# Patient Record
Sex: FEMALE | Race: WHITE | NOT HISPANIC OR LATINO | Employment: STUDENT | URBAN - METROPOLITAN AREA
[De-identification: names, ages, dates, MRNs, and addresses within clinical notes are randomized per-mention and may not be internally consistent; named-entity substitution may affect disease eponyms.]

---

## 2017-08-28 ENCOUNTER — TRANSCRIBE ORDERS (OUTPATIENT)
Dept: ADMINISTRATIVE | Facility: HOSPITAL | Age: 22
End: 2017-08-28

## 2017-08-28 DIAGNOSIS — N63.0 BREAST MASS: Primary | ICD-10-CM

## 2017-08-30 ENCOUNTER — HOSPITAL ENCOUNTER (OUTPATIENT)
Dept: ULTRASOUND IMAGING | Facility: HOSPITAL | Age: 22
Discharge: HOME | End: 2017-08-30
Admitting: PHYSICIAN ASSISTANT

## 2017-08-30 DIAGNOSIS — N63.0 BREAST MASS: ICD-10-CM

## 2017-08-30 PROCEDURE — 76642 ULTRASOUND BREAST LIMITED: CPT | Performed by: RADIOLOGY

## 2017-08-30 PROCEDURE — 76642 ULTRASOUND BREAST LIMITED: CPT

## 2019-01-18 ENCOUNTER — HOSPITAL ENCOUNTER (OUTPATIENT)
Facility: HOSPITAL | Age: 24
Discharge: HOME | End: 2019-01-18
Payer: MEDICAID

## 2019-01-18 PROCEDURE — 86480 TB TEST CELL IMMUN MEASURE: CPT

## 2019-01-21 ENCOUNTER — HOSPITAL ENCOUNTER (OUTPATIENT)
Dept: ULTRASOUND IMAGING | Facility: HOSPITAL | Age: 24
Discharge: HOME | End: 2019-01-21
Payer: MEDICAID

## 2019-01-21 DIAGNOSIS — R11.0 NAUSEA: ICD-10-CM

## 2019-01-21 DIAGNOSIS — R10.13 ABDOMINAL PAIN, EPIGASTRIC: ICD-10-CM

## 2019-01-21 DIAGNOSIS — R10.11 ABDOMINAL PAIN, RIGHT UPPER QUADRANT: ICD-10-CM

## 2019-01-21 PROCEDURE — 76705 ECHO EXAM OF ABDOMEN: CPT

## 2019-04-16 ENCOUNTER — HOSPITAL ENCOUNTER (OUTPATIENT)
Facility: HOSPITAL | Age: 24
Discharge: HOME | End: 2019-04-16
Payer: MEDICAID

## 2019-04-16 LAB
A/G RATIO: 2 (ref 0.8–2)
ALBUMIN SERPL-MCNC: 4.3 G/DL (ref 3.4–4.8)
ALP BLD-CCNC: 66 U/L (ref 25–100)
ALT SERPL-CCNC: 27 U/L (ref 4–36)
ANION GAP SERPL CALCULATED.3IONS-SCNC: 10 MMOL/L (ref 3–16)
AST SERPL-CCNC: 30 U/L (ref 8–33)
BASOPHILS ABSOLUTE: 0 K/UL (ref 0–0.1)
BASOPHILS RELATIVE PERCENT: 0.4 %
BILIRUB SERPL-MCNC: 0.4 MG/DL (ref 0.3–1.2)
BUN BLDV-MCNC: 22 MG/DL (ref 6–20)
CALCIUM SERPL-MCNC: 9.4 MG/DL (ref 8.5–10.5)
CHLORIDE BLD-SCNC: 103 MMOL/L (ref 98–107)
CHOLESTEROL, TOTAL: 176 MG/DL (ref 0–200)
CO2: 26 MMOL/L (ref 20–30)
CREAT SERPL-MCNC: 0.7 MG/DL (ref 0.4–1.2)
EOSINOPHILS ABSOLUTE: 0.1 K/UL (ref 0–0.4)
EOSINOPHILS RELATIVE PERCENT: 2.1 %
FOLATE: 11.34 NG/ML
GFR AFRICAN AMERICAN: >59
GFR NON-AFRICAN AMERICAN: >60
GLOBULIN: 2.1 G/DL
GLUCOSE BLD-MCNC: 87 MG/DL (ref 74–106)
HCT VFR BLD CALC: 40.8 % (ref 37–47)
HDLC SERPL-MCNC: 50 MG/DL (ref 40–60)
HEMOGLOBIN: 13.4 G/DL (ref 11.5–16.5)
IMMATURE GRANULOCYTES #: 0 K/UL
IMMATURE GRANULOCYTES %: 0.4 % (ref 0–5)
LDL CHOLESTEROL CALCULATED: 114 MG/DL
LYMPHOCYTES ABSOLUTE: 2.2 K/UL (ref 1.5–4)
LYMPHOCYTES RELATIVE PERCENT: 46.3 %
MCH RBC QN AUTO: 31.5 PG (ref 27–32)
MCHC RBC AUTO-ENTMCNC: 32.8 G/DL (ref 31–35)
MCV RBC AUTO: 95.8 FL (ref 80–100)
MONOCYTES ABSOLUTE: 0.3 K/UL (ref 0.2–0.8)
MONOCYTES RELATIVE PERCENT: 5.6 %
NEUTROPHILS ABSOLUTE: 2.2 K/UL (ref 2–7.5)
NEUTROPHILS RELATIVE PERCENT: 45.2 %
PDW BLD-RTO: 12.4 % (ref 11–16)
PLATELET # BLD: 185 K/UL (ref 150–400)
PMV BLD AUTO: 9.8 FL (ref 6–10)
POTASSIUM SERPL-SCNC: 4.4 MMOL/L (ref 3.4–5.1)
RBC # BLD: 4.26 M/UL (ref 3.8–5.8)
SODIUM BLD-SCNC: 139 MMOL/L (ref 136–145)
TOTAL PROTEIN: 6.4 G/DL (ref 6.4–8.3)
TRIGL SERPL-MCNC: 59 MG/DL (ref 0–249)
TSH SERPL DL<=0.05 MIU/L-ACNC: 2.14 UIU/ML (ref 0.35–5.5)
VITAMIN B-12: 398 PG/ML (ref 211–911)
VLDLC SERPL CALC-MCNC: 12 MG/DL
WBC # BLD: 4.8 K/UL (ref 4–11)

## 2019-04-16 PROCEDURE — 80061 LIPID PANEL: CPT

## 2019-04-16 PROCEDURE — 36415 COLL VENOUS BLD VENIPUNCTURE: CPT

## 2019-04-16 PROCEDURE — 82607 VITAMIN B-12: CPT

## 2019-04-16 PROCEDURE — 80053 COMPREHEN METABOLIC PANEL: CPT

## 2019-04-16 PROCEDURE — 84443 ASSAY THYROID STIM HORMONE: CPT

## 2019-04-16 PROCEDURE — 82746 ASSAY OF FOLIC ACID SERUM: CPT

## 2019-04-16 PROCEDURE — 85025 COMPLETE CBC W/AUTO DIFF WBC: CPT

## 2019-05-05 ENCOUNTER — OFFICE VISIT (OUTPATIENT)
Dept: PRIMARY CARE CLINIC | Age: 24
End: 2019-05-05
Payer: MEDICAID

## 2019-05-05 VITALS
SYSTOLIC BLOOD PRESSURE: 120 MMHG | DIASTOLIC BLOOD PRESSURE: 77 MMHG | HEART RATE: 86 BPM | TEMPERATURE: 98.1 F | WEIGHT: 157 LBS | OXYGEN SATURATION: 99 %

## 2019-05-05 DIAGNOSIS — H65.93 BILATERAL OTITIS MEDIA WITH EFFUSION: ICD-10-CM

## 2019-05-05 DIAGNOSIS — B96.89 ACUTE BACTERIAL SINUSITIS: Primary | ICD-10-CM

## 2019-05-05 DIAGNOSIS — J01.90 ACUTE BACTERIAL SINUSITIS: Primary | ICD-10-CM

## 2019-05-05 PROCEDURE — 99213 OFFICE O/P EST LOW 20 MIN: CPT | Performed by: NURSE PRACTITIONER

## 2019-05-05 RX ORDER — GUAIFENESIN 600 MG/1
1200 TABLET, EXTENDED RELEASE ORAL 2 TIMES DAILY PRN
COMMUNITY
Start: 2019-05-05

## 2019-05-05 RX ORDER — FLUCONAZOLE 150 MG/1
150 TABLET ORAL ONCE
Qty: 2 TABLET | Refills: 0 | Status: SHIPPED | OUTPATIENT
Start: 2019-05-05 | End: 2019-05-05

## 2019-05-05 RX ORDER — CEFDINIR 300 MG/1
300 CAPSULE ORAL 2 TIMES DAILY
Qty: 14 CAPSULE | Refills: 0 | Status: SHIPPED | OUTPATIENT
Start: 2019-05-05 | End: 2019-05-12

## 2019-05-05 RX ORDER — PROMETHAZINE HYDROCHLORIDE 6.25 MG/5ML
6.25 SYRUP ORAL 4 TIMES DAILY PRN
Qty: 1 BOTTLE | Refills: 0 | Status: SHIPPED | OUTPATIENT
Start: 2019-05-05 | End: 2019-05-12

## 2019-05-05 RX ORDER — BENZONATATE 100 MG/1
100 CAPSULE ORAL 2 TIMES DAILY PRN
Qty: 20 CAPSULE | Refills: 0 | Status: SHIPPED | OUTPATIENT
Start: 2019-05-05 | End: 2019-05-12

## 2019-05-05 RX ORDER — METHYLPREDNISOLONE 4 MG/1
TABLET ORAL
Qty: 1 KIT | Refills: 0 | Status: SHIPPED | OUTPATIENT
Start: 2019-05-05 | End: 2019-05-11

## 2019-05-05 ASSESSMENT — ENCOUNTER SYMPTOMS
COUGH: 1
DIARRHEA: 1

## 2019-05-05 NOTE — PROGRESS NOTES
Subjective:      Patient ID: Devante Pruett is a 20 y.o. female. HPI  C/o congestion and cough for the past 2 weeks. Productive cough with thick green mucous. She reports fever on Thurs/Fri of 102. Associated symptoms include HA, diarrhea, decreased appetite, trouble breathing at night- reports she sleeps elevated. Denies n/v, sob, wheezing, sore throat, earaches. She has taken OTC regimens with little relief of symptoms. Review of Systems   Constitutional: Positive for appetite change and fever. HENT: Positive for congestion. Respiratory: Positive for cough. Gastrointestinal: Positive for diarrhea. Neurological: Positive for headaches. Objective:   Physical Exam   Constitutional: She is oriented to person, place, and time. She appears well-developed and well-nourished. HENT:   Head: Normocephalic. Right Ear: External ear normal. There is drainage. A middle ear effusion is present. Left Ear: External ear normal. Tympanic membrane is scarred. A middle ear effusion is present. Nose: Mucosal edema present. Eyes: Pupils are equal, round, and reactive to light. Right eye exhibits no discharge. Left eye exhibits no discharge. No scleral icterus. Neck: Normal range of motion. Neck supple. No thyromegaly present. Cardiovascular: Normal rate, regular rhythm and normal heart sounds. Exam reveals no gallop and no friction rub. No murmur heard. Pulmonary/Chest: Effort normal and breath sounds normal. No respiratory distress. She has no wheezes. She has no rales. Musculoskeletal: Normal range of motion. She exhibits no edema, tenderness or deformity. Lymphadenopathy:     She has cervical adenopathy. Right cervical: Superficial cervical adenopathy present. Left cervical: Superficial cervical adenopathy present. Neurological: She is alert and oriented to person, place, and time. Skin: Skin is warm and dry. No rash noted. No erythema.    Psychiatric: She has a normal mood and affect.  Her behavior is normal. Judgment and thought content normal.       Assessment:      Sinusitis, BOM      Plan:      Omnicef, mucinex, medrol dose pack, tessalon perles, phenergan elixir, diflucan, tylenol or motrin for discomfort, increase fluids, rest        Valri Rough, APRN - NP

## 2019-05-27 ENCOUNTER — HOSPITAL ENCOUNTER (OUTPATIENT)
Facility: HOSPITAL | Age: 24
Discharge: HOME | End: 2019-05-27
Payer: MEDICAID

## 2019-05-27 PROCEDURE — 86480 TB TEST CELL IMMUN MEASURE: CPT

## 2019-05-30 LAB
QUANTI TB GOLD PLUS: NEGATIVE
QUANTI TB1 MINUS NIL: 0.03 IU/ML (ref 0–0.34)
QUANTI TB2 MINUS NIL: 0.06 IU/ML (ref 0–0.34)
QUANTIFERON MITOGEN: 9.82 IU/ML
QUANTIFERON NIL: 0.03 IU/ML

## 2019-07-18 NOTE — PROGRESS NOTES
[facility] AT [address], [address]  [place], [place], [address] [phone]  WWW[facility]           OUTPATIENT CLINIC CONSULTATION NOTE    Patient care team:  Patient Care Team:  Liset Hunt APRN as PCP - General (Family Medicine)    Requesting Provider and Reason for consultation: The patient is being seen today at the request of REMIGIO Cobos for this, presyncope.     Subjective:   Chief complaint:   Chief Complaint   Patient presents with   • Advice Only   • Numbness     hands, fingers   • Dizziness   • Shortness of Breath       HPI:    Zaida PELAYO is a 23 y.o. female.  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    Over the course of many years the patient has noted exertional lightheadedness, presyncope.  Symptoms seem to be worsening.  She has lost 80 pounds in the last year.  Denies associated chest pain otherwise but when she does have the symptoms of lightheadedness she develops cold hands/numbness.  Has palpitations on a daily basis.  No esvin syncope.    Does not use nicotine products.  Father had an MI at the age of 56.    Review of Systems:  For lightheadedness, presyncope, hand numbness, cold hands, exertional dyspnea  Negative for exertional chest pain, orthopnea, PND, lower extremity edema,  syncope.   All other systems reviewed and are negative.    PFSH:  Patient Active Problem List   Diagnosis   • Dysphagia   • Post-tonsillectomy pain   • Autonomic orthostatic hypotension         Current Outpatient Medications:   •  FLUoxetine (PROzac) 20 MG capsule, Take 20 mg by mouth Daily., Disp: , Rfl:   •  midodrine (PROAMATINE) 5 MG tablet, Take 1 tablet by mouth 3 (Three) Times a Day., Disp: 90 tablet, Rfl: 11    No Known Allergies    Social History     Socioeconomic History   • Marital status: Single     Spouse name: Not  "on file   • Number of children: Not on file   • Years of education: Not on file   • Highest education level: Not on file   Tobacco Use   • Smoking status: Never Smoker   • Smokeless tobacco: Never Used   Substance and Sexual Activity   • Alcohol use: No   • Drug use: No   • Sexual activity: No     Comment: wearing nuva ring insert     Family History   Problem Relation Age of Onset   • Breast cancer Maternal Aunt 30   • Hypertension Mother    • Thyroid disease Mother    • Heart disease Father    • Heart attack Father    • Stroke Father    • Diabetes Father    • Neuropathy Father    • Ovarian cancer Neg Hx    • Colon cancer Neg Hx    • BRCA 1/2 Neg Hx          Objective:   Physical Exam:  Blood pressure 132/82, pulse 92, height 160 cm (63\"), weight 73.7 kg (162 lb 6.4 oz).  CONSTITUTIONAL: Well-nourished. In no acute distress.   SKIN: Warm and dry. No rashes noted  HEENT: Head is normocephalic and atraumatic. Pupils are equal and reactive to light bilaterally. Mucous membranes are pink and moist.   NECK: Supple without masses or thyromegaly. There is no jugular venous distention at 30°.  LUNGS: Normal effort. Clear to auscultation bilaterally without wheezing, rhonchi, or rales noted.   CARDIOVASCULAR: Regular rate with a normal S1 and S2. There is no murmur, gallop, rub, or click appreciated. Carotid upstrokes are 2+ and symmetrical without bruits. There is no peripheral edema.  2+ DP pulses  ABDOMEN: Soft and nondistended. No tenderness with palpitation.   MUSCULOSKELETAL:  No digital cyanosis  NEUROLOGICAL: Nonfocal.  PSYCHIATRIC: Alert, orientated x 3, appropriate affect     Orthostatics in the office:  Lying, blood pressure 112/66, heart rate 95, sats 99%  Sitting, blood pressure 108/70, heart rate 84, sats 100%  Standing, blood pressure 90/68, heart rate 108, sats 100%    Labs:  BUN   Date Value Ref Range Status   12/20/2016 8 (L) 9 - 23 mg/dL Final     Creatinine   Date Value Ref Range Status   12/20/2016 0.60 " 0.60 - 1.30 mg/dL Final     Potassium   Date Value Ref Range Status   12/20/2016 4.1 3.5 - 5.5 mmol/L Final     WBC   Date Value Ref Range Status   04/16/2019 4.8 4.0 - 11.0 K/uL Final     Hemoglobin   Date Value Ref Range Status   04/16/2019 13.4 11.5 - 16.5 g/dL Final     Hematocrit   Date Value Ref Range Status   04/16/2019 40.8 37.0 - 47.0 % Final     Platelets   Date Value Ref Range Status   04/16/2019 185 150 - 400 K/uL Final       No results found for: CHOL  Lab Results   Component Value Date    TRIG 59 04/16/2019     Lab Results   Component Value Date    HDL 50 04/16/2019     Lab Results   Component Value Date     04/16/2019     No components found for: LDLDIRECTC    Labs reviewed, normal thyroid, normal electrolytes, normal liver/kidney function, normal hemoglobin    Diagnostic Data:      ECG 12 Lead  Date/Time: 7/19/2019 3:33 PM  Performed by: Matteo Quintana MD  Authorized by: Matteo Quintana MD   Previous ECG: no previous ECG available  Rhythm: sinus tachycardia  Comments: Heart rate 109 bpm, QRS 85, QTc 388            Assessment and Plan:   Zaida was seen today for advice only, numbness, dizziness and shortness of breath.    Diagnoses and all orders for this visit:    Autonomic orthostatic hypotension  Lightheadedness  Pre-syncope  -Symptoms consistent with autonomic dysfunction  -Orthostatics positive today with a drop in systolic pressure from lying to standing of greater than 20 mmHg and a heart rate increase of only 13 bpm  -48-hour Holter monitor  -Lifestyle modifications recommended including hydration with at least 32 ounces of additional fluids per day, liberalization of salt in her diet, regular forms of cardiac exercise, compression stockings if being on her feet for long periods of time, elevating her legs while at rest  -Trial of Midrin 5 mg 3 times daily, can increase to 10 mg 3 times daily if symptoms not significantly improved  -If she continues to have more rapid heart rates, can  consider low-dose of beta  -His symptoms do not significantly improve, consider tilt table study and echocardiogram      - Return in about 3 months (around 10/19/2019).    Matteo Quintana MD, MSc, [facility]  Interventional Cardiology  [facility] at [facility]

## 2019-07-19 ENCOUNTER — CONSULT (OUTPATIENT)
Dept: CARDIOLOGY | Facility: CLINIC | Age: 24
End: 2019-07-19

## 2019-07-19 ENCOUNTER — HOSPITAL ENCOUNTER (OUTPATIENT)
Facility: HOSPITAL | Age: 24
Discharge: HOME | End: 2019-07-19
Payer: MEDICAID

## 2019-07-19 VITALS
DIASTOLIC BLOOD PRESSURE: 82 MMHG | BODY MASS INDEX: 28.77 KG/M2 | WEIGHT: 162.4 LBS | SYSTOLIC BLOOD PRESSURE: 132 MMHG | HEIGHT: 63 IN | HEART RATE: 92 BPM

## 2019-07-19 DIAGNOSIS — I95.1 AUTONOMIC ORTHOSTATIC HYPOTENSION: Primary | ICD-10-CM

## 2019-07-19 DIAGNOSIS — R42 LIGHTHEADEDNESS: ICD-10-CM

## 2019-07-19 DIAGNOSIS — R55 PRE-SYNCOPE: ICD-10-CM

## 2019-07-19 PROCEDURE — 93226 XTRNL ECG REC<48 HR SCAN A/R: CPT

## 2019-07-19 PROCEDURE — 99244 OFF/OP CNSLTJ NEW/EST MOD 40: CPT | Performed by: INTERNAL MEDICINE

## 2019-07-19 PROCEDURE — 93225 XTRNL ECG REC<48 HRS REC: CPT

## 2019-07-19 PROCEDURE — 93005 ELECTROCARDIOGRAM TRACING: CPT

## 2019-07-19 PROCEDURE — 93000 ELECTROCARDIOGRAM COMPLETE: CPT | Performed by: INTERNAL MEDICINE

## 2019-07-19 RX ORDER — FLUOXETINE HYDROCHLORIDE 20 MG/1
20 CAPSULE ORAL DAILY
COMMUNITY

## 2019-07-19 RX ORDER — MIDODRINE HYDROCHLORIDE 5 MG/1
5 TABLET ORAL 3 TIMES DAILY
Qty: 90 TABLET | Refills: 11 | Status: SHIPPED | OUTPATIENT
Start: 2019-07-19 | End: 2019-10-28

## 2019-08-09 ENCOUNTER — TELEPHONE (OUTPATIENT)
Dept: CARDIOLOGY | Facility: CLINIC | Age: 24
End: 2019-08-09

## 2019-08-09 NOTE — TELEPHONE ENCOUNTER
----- Message from Matteo Quintana MD sent at 8/8/2019  6:32 PM EDT -----  Please let the patient know her monitor did not show a significant arrhythmia, but she does have a higher average heart rate of 96 bpm.  Please ask if the Midrin is helping.  If not, we will add a low-dose beta-blocker

## 2019-08-09 NOTE — TELEPHONE ENCOUNTER
Patient contacted to review monitor results. No answer, LVM requesting return call regarding the midodrine.

## 2019-08-09 NOTE — TELEPHONE ENCOUNTER
Patient returned call and stated that she has discontinued the Midodrine on Tuesday because it was causing an upset stomach and she did not feel as if it was helping her symptoms.

## 2019-08-13 RX ORDER — FLUDROCORTISONE ACETATE 0.1 MG/1
0.1 TABLET ORAL DAILY
Qty: 30 TABLET | Refills: 11 | Status: SHIPPED | OUTPATIENT
Start: 2019-08-13 | End: 2019-10-28

## 2019-08-13 NOTE — TELEPHONE ENCOUNTER
Patient contacted with recommendations per MJS. Pt to begin Florinef 0.1 mg daily. LVM requesting return call with any further questions.

## 2019-10-28 ENCOUNTER — OFFICE VISIT (OUTPATIENT)
Dept: CARDIOLOGY | Facility: CLINIC | Age: 24
End: 2019-10-28

## 2019-10-28 VITALS
OXYGEN SATURATION: 99 % | BODY MASS INDEX: 31.18 KG/M2 | WEIGHT: 176 LBS | SYSTOLIC BLOOD PRESSURE: 110 MMHG | HEIGHT: 63 IN | DIASTOLIC BLOOD PRESSURE: 74 MMHG | HEART RATE: 92 BPM

## 2019-10-28 DIAGNOSIS — I95.1 AUTONOMIC ORTHOSTATIC HYPOTENSION: Primary | ICD-10-CM

## 2019-10-28 DIAGNOSIS — R55 SYNCOPE AND COLLAPSE: ICD-10-CM

## 2019-10-28 PROCEDURE — 99214 OFFICE O/P EST MOD 30 MIN: CPT | Performed by: INTERNAL MEDICINE

## 2019-10-28 RX ORDER — SODIUM CHLORIDE 1000 MG
1 TABLET, SOLUBLE MISCELLANEOUS 2 TIMES DAILY WITH MEALS
Qty: 60 TABLET | Refills: 11 | Status: SHIPPED | OUTPATIENT
Start: 2019-10-28

## 2019-10-28 RX ORDER — FLUDROCORTISONE ACETATE 0.1 MG/1
0.2 TABLET ORAL DAILY
Qty: 60 TABLET | Refills: 11 | Status: SHIPPED | OUTPATIENT
Start: 2019-10-28 | End: 2019-12-19

## 2019-10-28 NOTE — PROGRESS NOTES
[facility] AT [address], [address]  [place], [place], [address] [phone]  [facility]           OUTPATIENT CLINIC FOLLOW UP NOTE    Patient Care Team:  Patient Care Team:  Liset Hunt APRN as PCP - General (Family Medicine)    Subjective:      Chief Complaint   Patient presents with   • orhtostatic hypotension       HPI:    Zaida Yates is a 24 y.o. female.  Cardiac problem list:  1. Lightheadedness, relative hypotension, relative tachycardia, autonomic dysfunction  2. Anxiety/depression    Today the patient presents for follow-up.    The patient tried Midrin but felt that it caused her to have an upset stomach.  Has been taking Florinef without difficulty but does not feel significantly better.  Still having daily symptoms of lightheadedness.  Had one episode of syncope while at work.  Symptoms improved with rest and drinking fluids.  The patient is exercising nearly on a daily basis on the elliptical or running.  Does not feel poorly when she does this.  Heart rate averaging in the 90s on her Apple watch.  At times she is noted into the 110s while at rest    Review of Systems:  Positive for lightheadedness, palpitations, syncope  Negative for exertional chest pain, dyspnea with exertion, orthopnea, PND, lower extremity edema     PFSH:  Patient Active Problem List   Diagnosis   • Dysphagia   • Post-tonsillectomy pain   • Autonomic orthostatic hypotension         Current Outpatient Medications:   •  fludrocortisone 0.1 MG tablet, Take 2 tablets by mouth Daily., Disp: 60 tablet, Rfl: 11  •  FLUoxetine (PROzac) 20 MG capsule, Take 20 mg by mouth Daily., Disp: , Rfl:   •  sodium chloride 1 g tablet, Take 1 tablet by mouth 2 (Two) Times a Day With Meals., Disp: 60 tablet, Rfl: 11    No Known Allergies     reports that she has never smoked. She has never used smokeless tobacco.    Family History   Problem Relation Age of Onset   • Breast cancer Maternal Aunt  "30   • Hypertension Mother    • Thyroid disease Mother    • Heart disease Father    • Heart attack Father    • Stroke Father    • Diabetes Father    • Neuropathy Father    • Ovarian cancer Neg Hx    • Colon cancer Neg Hx    • BRCA 1/2 Neg Hx          Objective:   Physical exam:  /74 (BP Location: Left arm, Patient Position: Standing)   Pulse 92   Ht 160 cm (63\")   Wt 79.8 kg (176 lb)   SpO2 99%   BMI 31.18 kg/m²   CONSTITUTIONAL: No acute distress  RESPIRATORY: Normal effort. Clear to auscultation bilaterally without wheezing or rales  CARDIOVASCULAR: Carotids with normal upstrokes without bruits.  Regular rate and rhythm with normal S1 and S2. Without murmur, gallop or rub. Normal radial pulse.   PSYCH: Normal affect    Labs:    BUN   Date Value Ref Range Status   12/20/2016 8 (L) 9 - 23 mg/dL Final     Creatinine   Date Value Ref Range Status   12/20/2016 0.60 0.60 - 1.30 mg/dL Final     Potassium   Date Value Ref Range Status   12/20/2016 4.1 3.5 - 5.5 mmol/L Final       No results found for: CHOL  Lab Results   Component Value Date    TRIG 59 04/16/2019     Lab Results   Component Value Date    HDL 50 04/16/2019     Lab Results   Component Value Date     04/16/2019     No components found for: LDLDIRECTC    Diagnostic Data:    Procedures    48-hour Holter 8/2019 - rare ectopy, average heart rate 96, no significant arrhythmia    Assessment and Plan:   Zaida was seen today for orhtostatic hypotension.    Diagnoses and all orders for this visit:    Autonomic orthostatic hypotension  -Patient is still having daily symptoms  -Recommend tilt table testing  -Midrin made her stomach upset.  Florinef 0.1 mg daily not helping.  Increase Florinef to 0.2 mg daily, start 1 g of salt twice daily, continue to maintain high levels of hydration, continue cardiac exercise  -If she is found to have a POTS-like syndrome, will start Zebeta and decrease Florinef      - Return in about 3 months (around " 1/28/2020).    Matteo Quintana MD, MSc, FACC

## 2019-12-12 ENCOUNTER — HOSPITAL ENCOUNTER (OUTPATIENT)
Dept: CARDIOLOGY | Facility: HOSPITAL | Age: 24
Discharge: HOME | End: 2019-12-12
Admitting: INTERNAL MEDICINE

## 2019-12-12 DIAGNOSIS — R55 SYNCOPE AND COLLAPSE: ICD-10-CM

## 2019-12-12 PROCEDURE — 93660 TILT TABLE EVALUATION: CPT

## 2019-12-12 PROCEDURE — 93660 TILT TABLE EVALUATION: CPT | Performed by: INTERNAL MEDICINE

## 2019-12-17 ENCOUNTER — TELEPHONE (OUTPATIENT)
Dept: CARDIOLOGY | Facility: CLINIC | Age: 24
End: 2019-12-17

## 2019-12-17 NOTE — TELEPHONE ENCOUNTER
Patient states that the increased Florinef and salt tablets have not made much of a difference in her symptoms.

## 2019-12-17 NOTE — TELEPHONE ENCOUNTER
----- Message from Matteo Quintana MD sent at 12/16/2019  5:57 PM EST -----  Please let the patient know her tilt table test was diagnostic for POTS (postural orthostatic tachycardia syndrome) without syncope.  Can you ask if the increased Florinef dosing and salt tablets have helped her at all?  If not, will consider decreasing Florinef and adding low doses of Zebeta

## 2019-12-19 RX ORDER — PROPRANOLOL HYDROCHLORIDE 20 MG/1
20 TABLET ORAL DAILY
Qty: 30 TABLET | Refills: 11 | Status: SHIPPED | OUTPATIENT
Start: 2019-12-19 | End: 2020-02-03

## 2019-12-19 RX ORDER — FLUDROCORTISONE ACETATE 0.1 MG/1
0.1 TABLET ORAL DAILY
Qty: 60 TABLET | Refills: 11
Start: 2019-12-19 | End: 2020-12-18

## 2019-12-19 NOTE — TELEPHONE ENCOUNTER
Patient contacted to review recommendations per MJS. Patient to decrease Florinef to 0.1 mg and begin propanolol 20 mg daily. LVM requesting return call to discuss further.

## 2020-02-03 ENCOUNTER — OFFICE VISIT (OUTPATIENT)
Dept: CARDIOLOGY | Facility: CLINIC | Age: 25
End: 2020-02-03

## 2020-02-03 VITALS
DIASTOLIC BLOOD PRESSURE: 62 MMHG | OXYGEN SATURATION: 98 % | WEIGHT: 187.6 LBS | SYSTOLIC BLOOD PRESSURE: 96 MMHG | HEART RATE: 94 BPM | HEIGHT: 62 IN | BODY MASS INDEX: 34.52 KG/M2

## 2020-02-03 DIAGNOSIS — I95.1 AUTONOMIC ORTHOSTATIC HYPOTENSION: ICD-10-CM

## 2020-02-03 DIAGNOSIS — G90.A POTS (POSTURAL ORTHOSTATIC TACHYCARDIA SYNDROME): Primary | ICD-10-CM

## 2020-02-03 PROCEDURE — 99214 OFFICE O/P EST MOD 30 MIN: CPT | Performed by: INTERNAL MEDICINE

## 2020-02-03 NOTE — PROGRESS NOTES
[facility] AT [address], [address]  [place], [place], [address] [phone]  WWW[facility]           OUTPATIENT CLINIC FOLLOW UP NOTE    Patient Care Team:  Patient Care Team:  Liset Hunt APRN as PCP - General (Family Medicine)    Subjective:      Chief Complaint   Patient presents with   • Dizziness   • Loss of Consciousness       HPI:    Zaida Yates is a 24 y.o. female.  Cardiac problem list:  1. POTS, Lightheadedness, relative hypotension, relative tachycardia, autonomic dysfunction  2. Anxiety/depression    Today the patient presents for follow-up.    Since the patient was last seen she has had continued lightheadedness and notes 2 syncopal episodes.  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  She did start propranolol as instructed, but does not notice any improvement in her symptoms.  She continues to take fludrocortisone, salt tablets, and is drinking at least 1/2 gallon of water daily.    Review of Systems:  Positive for lightheadedness, syncope  Negative for exertional chest pain, dyspnea with exertion, orthopnea, PND, lower extremity edema     PFSH:  Patient Active Problem List   Diagnosis   • Dysphagia   • Post-tonsillectomy pain   • Autonomic orthostatic hypotension         Current Outpatient Medications:   •  fludrocortisone 0.1 MG tablet, Take 1 tablet by mouth Daily., Disp: 60 tablet, Rfl: 11  •  FLUoxetine (PROzac) 20 MG capsule, Take 20 mg by mouth Daily., Disp: , Rfl:   •  propranolol (INDERAL) 20 MG tablet, Take 1 tablet by mouth Daily., Disp: 30 tablet, Rfl: 11  •  sodium chloride 1 g tablet, Take 1 tablet by mouth 2 (Two) Times a Day With Meals., Disp: 60 tablet, Rfl: 11    No Known Allergies     reports that she has never smoked. She has never used smokeless tobacco.    Family History   Problem Relation Age of Onset   • Breast cancer Maternal Aunt 30   • Hypertension Mother    •  "Thyroid disease Mother    • Heart disease Father    • Heart attack Father    • Stroke Father    • Diabetes Father    • Neuropathy Father    • Ovarian cancer Neg Hx    • Colon cancer Neg Hx    • BRCA 1/2 Neg Hx          Objective:   Physical exam:  BP 96/62 (BP Location: Left arm, Patient Position: Standing)   Pulse 94   Ht 157.5 cm (62\")   Wt 85.1 kg (187 lb 9.6 oz)   SpO2 98%   BMI 34.31 kg/m²   CONSTITUTIONAL: No acute distress  RESPIRATORY: Normal effort. Clear to auscultation bilaterally without wheezing or rales  CARDIOVASCULAR:  Regular rate and rhythm with normal S1 and S2. Without murmur, gallop or rub. Normal radial pulse.   PSYCH: Normal affect    Labs:    BUN   Date Value Ref Range Status   12/20/2016 8 (L) 9 - 23 mg/dL Final     Creatinine   Date Value Ref Range Status   12/20/2016 0.60 0.60 - 1.30 mg/dL Final     Potassium   Date Value Ref Range Status   12/20/2016 4.1 3.5 - 5.5 mmol/L Final       No results found for: CHOL  Lab Results   Component Value Date    TRIG 59 04/16/2019     Lab Results   Component Value Date    HDL 50 04/16/2019     Lab Results   Component Value Date     04/16/2019     No components found for: LDLDIRECTC    Diagnostic Data:    Procedures    Tilt table test 12/2019  · Tilt table test was negative for neurocardiogenic syncope.  · Patient had postural tachycardia noted immediately upon head upright tilt that resolved upon termination of test.  · During tilt, patient with repeated episodes of what appeared to be sinus tachycardia, with abrupt drop in heart rate from 130-150, to the  range associated with a low atrial rhythm, with restoration of sinus rhythm within 2-3 beats.  · Note the tachycardic episodes were not symptomatic.    48-hour Holter 8/2019 - rare ectopy, average heart rate 96, no significant arrhythmia    Assessment and Plan:   Zaida was seen today for orhtostatic hypotension.    Diagnoses and all orders for this visit:    Autonomic orthostatic " hypotension  POTS  -Patient is still having daily symptoms  -Midrin made her stomach upset.    -Discontinue propanolol at this time as the patient is not seeing any clear benefit from it.  -Continue fludrocortisone and salt tablets.  -Patient instructed to wear compression stockings when she will be standing for long periods of time.  -Referral to [place] [facility].    - Return in about 6 months (around 8/3/2020).    Scribed for Matteo Quintana MD by Mercy Wallace, REMIGIO   2/3/2020  11:05 AM     I, Matteo Quintana MD, personally performed the services as scribed by the above named individual. I have made any necessary edits and it is both accurate and complete.     Matteo Quintana MD, MSc, [facility]  Interventional Cardiology  [facility] at [facility]

## 2020-02-18 ENCOUNTER — TELEPHONE (OUTPATIENT)
Dept: CARDIOLOGY | Facility: CLINIC | Age: 25
End: 2020-02-18

## 2020-02-18 NOTE — TELEPHONE ENCOUNTER
Sent orthostatic BP record in mail for patient to fill out and return to complete [place] referral.

## 2020-03-16 ENCOUNTER — HOSPITAL ENCOUNTER (OUTPATIENT)
Facility: HOSPITAL | Age: 25
Discharge: HOME | End: 2020-03-16
Payer: MEDICAID

## 2020-03-16 ENCOUNTER — HOSPITAL ENCOUNTER (OUTPATIENT)
Dept: GENERAL RADIOLOGY | Facility: HOSPITAL | Age: 25
Discharge: HOME | End: 2020-03-16
Payer: MEDICAID

## 2020-03-16 PROCEDURE — 73110 X-RAY EXAM OF WRIST: CPT

## 2020-03-24 ENCOUNTER — TELEPHONE (OUTPATIENT)
Dept: CARDIOLOGY | Facility: CLINIC | Age: 25
End: 2020-03-24

## 2020-10-07 ENCOUNTER — TELEPHONE (OUTPATIENT)
Dept: BEHAVIORAL/MENTAL HEALTH | Age: 25
End: 2020-10-07

## 2020-10-07 NOTE — TELEPHONE ENCOUNTER
PCP  reached out to [facility] making referral for Pt. [facility] identified that Pt's  \"Boyfriend left very depressed \"    [facility] reached out to pt. To discuss referral and schedule with Pt. Pt provided times Thursday and Friday 10-8 and 10-9, that she was not available. [facility] offered several times, pt chose the following day 10/8/2020 @ 2:30PM. [facility] Scheduled in EHR.

## 2020-10-08 ENCOUNTER — VIRTUAL VISIT (OUTPATIENT)
Dept: BEHAVIORAL/MENTAL HEALTH | Age: 25
End: 2020-10-08

## 2020-10-08 ASSESSMENT — PATIENT HEALTH QUESTIONNAIRE - PHQ9
10. IF YOU CHECKED OFF ANY PROBLEMS, HOW DIFFICULT HAVE THESE PROBLEMS MADE IT FOR YOU TO DO YOUR WORK, TAKE CARE OF THINGS AT HOME, OR GET ALONG WITH OTHER PEOPLE: 2
SUM OF ALL RESPONSES TO PHQ9 QUESTIONS 1 & 2: 4
1. LITTLE INTEREST OR PLEASURE IN DOING THINGS: 2
4. FEELING TIRED OR HAVING LITTLE ENERGY: 3
2. FEELING DOWN, DEPRESSED OR HOPELESS: 2
9. THOUGHTS THAT YOU WOULD BE BETTER OFF DEAD, OR OF HURTING YOURSELF: 0
5. POOR APPETITE OR OVEREATING: 1
7. TROUBLE CONCENTRATING ON THINGS, SUCH AS READING THE NEWSPAPER OR WATCHING TELEVISION: 0
3. TROUBLE FALLING OR STAYING ASLEEP: 3
SUM OF ALL RESPONSES TO PHQ QUESTIONS 1-9: 14
8. MOVING OR SPEAKING SO SLOWLY THAT OTHER PEOPLE COULD HAVE NOTICED. OR THE OPPOSITE, BEING SO FIGETY OR RESTLESS THAT YOU HAVE BEEN MOVING AROUND A LOT MORE THAN USUAL: 0
6. FEELING BAD ABOUT YOURSELF - OR THAT YOU ARE A FAILURE OR HAVE LET YOURSELF OR YOUR FAMILY DOWN: 3
SUM OF ALL RESPONSES TO PHQ QUESTIONS 1-9: 14

## 2020-10-08 ASSESSMENT — ANXIETY QUESTIONNAIRES
5. BEING SO RESTLESS THAT IT IS HARD TO SIT STILL: 0-NOT AT ALL
3. WORRYING TOO MUCH ABOUT DIFFERENT THINGS: 3-NEARLY EVERY DAY
6. BECOMING EASILY ANNOYED OR IRRITABLE: 2-OVER HALF THE DAYS
7. FEELING AFRAID AS IF SOMETHING AWFUL MIGHT HAPPEN: 1-SEVERAL DAYS
1. FEELING NERVOUS, ANXIOUS, OR ON EDGE: 2-OVER HALF THE DAYS
4. TROUBLE RELAXING: 2-OVER HALF THE DAYS
2. NOT BEING ABLE TO STOP OR CONTROL WORRYING: 1-SEVERAL DAYS
GAD7 TOTAL SCORE: 11

## 2020-10-08 NOTE — PROGRESS NOTES
Behavioral Health Consultation  KASEY Nails, hospitalsW  Behavior Health Consultant  10/8/2020  2:30 PM      Time spent with Patient: 60 minutes Via audio Services. This is patient's 1st [facility] appointment. Reason for Consult:  Depression, and Anxiety  Referring Provider: [facility]: Morris Mary  Pt. Location:  Home (Due to COVID-19 pandemic)  Provider Location: [facility] ([facility]): [address] [place] [address].    Discussed with patient model of service to include the limits of confidentiality (i.e. abuse reporting, suicide intervention, etc.) and short-term intervention focused approach. Pt indicated understanding. Pt provided informed consent verbally for the behavioral health program, and telehealth program.    S:  Pt is a 30year old  Female presenting to telehealth services following referral from PCP, related to Depression following the end of a relationship. Pt identified that she was in a relationship with her fiancé for 5 years, and they had been engaged for 2 years. Pt identified ongoing depressive and anxious symptomology that had been ongoing throughout the relationship. Pt identified that the relationship was toxic, he put on a front for everyone else, but he was not nice to me. I have been hurting on the inside for a long time.   Pt identified that she was having problems with sleep Ive been getting 1 hour maybe 2 hours everyday for the last month. 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 Pt identified that I need to get out of this funk, I feel like I am starting over at 25, and I dont know how to do it, I feel like everyone else is doing good, and things are working out for them, but not for me. I have no motivation, I need to see a light at the end of the tunnel, and I dont right now.   Pt admits to decreased self-esteem, as well as some problems with some of some of the negative thoughts creeping back in, as pt identified having problems with an eating disorder in 2011. Pt said she was trying tnot to slip back into hold habits(laxatives etc), Im trying to manage the thoughts in a healthy way.   Pt discussed coping skills she used, as taking walks when I have time, and trying to use white noise as I fall asleep. Pt discussed relationship with ex, and some of the toxic things he did including gaslighting. Pt identified being prescribed Prozac, and recently trazodone to help with sleep. Pt identified being fearful to try the trazodone then night before because she had a test today. Pt identified I am hard on myself, I want to control everything, and I know I cant control everything. I need to learn how to take time for myself, learn to journal, and  better, and getting a better sleep schedule with more balance.         O:  MSE:  Appearance: Not Evaluated, as session was over phone. Attitude: \"cooperative\",\"friendly\",  Consciousness: \"alert\"  Orientation: \"oriented to person, place, time, general circumstance\"  Memory: \"recent and remote memory intact\"  Attention/Concentration: \"intact during session\"  Psychomotor Activity:\" Not Evaluated, as session was over phone. Eye Contact: Not Evaluated, as session was over phone. Speech: \"normal rate and volume, well-articulated\",  Mood: \"anxious\",\"dysphoric  Affect: \"congruent with thought content and mood\"  Perception: \"within normal limits\",\"  Thought Content: \"within normal limits\"  Thought Process: \"logical, goal-directed, coherent\"  Insight: \"good  Judgment: \"intact\"  Morbid ideation: Denies. Suicide Assessment: No Suicidal Ideation at this time. History:    Medications:   Current Outpatient Medications   Medication Sig Dispense Refill    sertraline (ZOLOFT) 50 MG tablet   5    guaiFENesin (MUCINEX) 600 MG extended release tablet Take 2 tablets by mouth 2 times daily as needed for Congestion       No current facility-administered medications for this visit.         Social History:   Social History     Socioeconomic History    Marital status: Single     Spouse name: Not on file    Number of children: Not on file    Years of education: Not on file    Highest education level: Not on file   Occupational History    Not on file   Social Needs    Financial resource strain: Not on file    Food insecurity     Worry: Not on file     Inability: Not on file    Transportation needs     Medical: Not on file     Non-medical: Not on file   Tobacco Use    Smoking status: Not on file   Substance and Sexual Activity    Alcohol use: Not on file    Drug use: Not on file    Sexual activity: Not on file   Lifestyle    Physical activity     Days per week: Not on file     Minutes per session: Not on file    Stress: Not on file   Relationships    Social connections     Talks on phone: Not on file     Gets together: Not on file     Attends Lutheran service: Not on file     Active member of club or organization: Not on file     Attends meetings of clubs or organizations: Not on file     Relationship status: Not on file    Intimate partner violence     Fear of current or ex partner: Not on file     Emotionally abused: Not on file     Physically abused: Not on file     Forced sexual activity: Not on file   Other Topics Concern    Not on file   Social History Narrative    Not on file       TOBACCO:   has no history on file for tobacco.  ETOH:   has no history on file for alcohol. Family History:   No family history on file. A:  Risk Assessment, and below. RAJANI 7 SCORE 10/8/2020   RAJANI-7 Total Score 11     Interpretation of RAJANI-7 score: 5-9 = mild anxiety, 10-14 = moderate anxiety, 15+ = severe anxiety. Recommend referral to behavioral health for scores 10 or greater.     PHQ Scores 10/8/2020   PHQ2 Score 4   PHQ9 Score 14     Interpretation of Total Score Depression Severity: 1-4 = Minimal depression, 5-9 = Mild depression, 10-14 = Moderate depression, 15-19 = Moderately severe depression, 20-27 = Severe depression      Diagnosis:    F32.9 Unspecified Depressive Disorder  F41.9 Unspecified Anxiety Disorder    Rule out:  F31.9 Unspecified Bipolar Disorder      Plan:  Pt interventions:  Pt provided informed consent verbally for the behavioral health program, and telehealth program. Discussed with patient model of service to include the limits of confidentiality (i.e. abuse reporting, suicide intervention, etc.) and short-term intervention focused approach. Pt indicated understanding. Feedback given to PCP. Discussed perimeters of Brief Interventions with [facility] and offered referral to outpatient care for behavioral health services. Pt expressed understanding. Provided Pt with psychoeducation, including information about Coping Skills, including some information including sensory distraction techniques, as well as anxiety and depressive coping skills handouts. Discussion of longer term therapeutic options, involving outpatient therapy including telehealth options that were available. Discussed options, and identified [facility] being best option at this time, due to pts limited time, as well as insurance. Also identified that there was the option for Medication management if needed down the line. Pt Behavioral Change Plan:   Pt identified I am hard on myself, I want to control everything, and I know I cant control everything. I need to learn how to take time for myself, learn to journal, and  better, and getting a better sleep schedule with more balance.      [facility] provided pt with contact information. Pt to contact [facility] back if she had any questions, or needed to talk before she could get an appointment.  Safety plan identified. Provided with 24 hour crisis number to use if symptoms intensify. Also provided with Provided with contact information for [facility] if any questions or problems arose.      [facility] also provided pt with coping skills worksheets to Pt, including sensory distraction techniques, as well as anxiety and depressive coping skills handouts.  [facility] provided [facility] contact information, as well as contact information for [facility].  [facility] explained how [facility]  had changed their methods for referral, and were requesting direct requests from Patients to schedule appt, Pt identified being understanding of this, and agreeable.  PT to contact [facility] to make appt, and to contact [facility] back if she has any questions, or needs to talk before session with [facility].       Electronically signed by Herlinda Haywood LCSW on 10/9/2020 at 11:08 AM

## 2020-12-17 ENCOUNTER — TELEPHONE (OUTPATIENT)
Dept: BEHAVIORAL/MENTAL HEALTH | Age: 25
End: 2020-12-17

## 2020-12-17 NOTE — TELEPHONE ENCOUNTER
PCP reached out to discuss referral, PCP told [facility] that pt was in Crisis, and needed help. [facility] met with pt in October, and referred to [facility] reached out to Pt, following referral related to crisis. [facility] inquired about Pt reaching out and scheduling with [facility], pt identified that she did not get in with [facility]. [facility] inquired if pt wanted to schedule time for a session the following day to talk and figure out a game plan. Pt agreeable and identified anytime the following day 12/18/2020, would work for her, [facility] offered multiple times between 10-3. Pt identified 1PM would work best for her. [facility] scheduled in EHR.

## 2020-12-18 ENCOUNTER — VIRTUAL VISIT (OUTPATIENT)
Dept: BEHAVIORAL/MENTAL HEALTH | Age: 25
End: 2020-12-18

## 2020-12-18 ENCOUNTER — OFFICE VISIT (OUTPATIENT)
Dept: CARDIOLOGY | Facility: CLINIC | Age: 25
End: 2020-12-18

## 2020-12-18 VITALS
OXYGEN SATURATION: 100 % | HEIGHT: 63 IN | SYSTOLIC BLOOD PRESSURE: 122 MMHG | HEART RATE: 98 BPM | DIASTOLIC BLOOD PRESSURE: 74 MMHG | BODY MASS INDEX: 33.23 KG/M2

## 2020-12-18 DIAGNOSIS — I95.1 AUTONOMIC ORTHOSTATIC HYPOTENSION: ICD-10-CM

## 2020-12-18 DIAGNOSIS — G90.A POTS (POSTURAL ORTHOSTATIC TACHYCARDIA SYNDROME): Primary | ICD-10-CM

## 2020-12-18 PROCEDURE — 99214 OFFICE O/P EST MOD 30 MIN: CPT | Performed by: INTERNAL MEDICINE

## 2020-12-18 RX ORDER — TRAZODONE HYDROCHLORIDE 50 MG/1
50 TABLET ORAL
COMMUNITY
Start: 2020-10-07

## 2020-12-18 NOTE — PROGRESS NOTES
[facility] AT [address], [address]  [place], [place], [id number] [phone]  WWW[facility]           OUTPATIENT CLINIC FOLLOW UP NOTE    Patient Care Team:  Patient Care Team:  Liset Hunt APRN as PCP - General (Family Medicine)    Subjective:      Chief Complaint   Patient presents with   • Rapid Heart Rate       HPI:    Zaida Yates is a 25 y.o. female.  Cardiac problem list:  1. POTS, Lightheadedness, relative hypotension, relative tachycardia, autonomic dysfunction  2. Anxiety/depression    Today the patient presents for follow-up.    The patient was last seen she reports that she felt poorly while taking Florinef and discontinued it.  She also reports weight gain while taking the medication.  She has not been scheduled to see the autonomic dysfunction clinic at [place] at this time.  She is interested in possibly going to another facility to be seen.  She also reports increased anxiety and depression as well as poor sleep.  She still is getting lightheaded frequently especially while working.  She continues to take salt tablets.    Review of Systems:  Positive for lightheadedness, anxiety/depression, poor sleep  Negative for exertional chest pain, dyspnea with exertion, orthopnea, PND, lower extremity edema     PFSH:  Patient Active Problem List   Diagnosis   • Dysphagia   • Post-tonsillectomy pain   • Autonomic orthostatic hypotension         Current Outpatient Medications:   •  fludrocortisone 0.1 MG tablet, Take 1 tablet by mouth Daily., Disp: 60 tablet, Rfl: 11  •  FLUoxetine (PROzac) 20 MG capsule, Take 20 mg by mouth Daily., Disp: , Rfl:   •  sodium chloride 1 g tablet, Take 1 tablet by mouth 2 (Two) Times a Day With Meals., Disp: 60 tablet, Rfl: 11  •  traZODone (DESYREL) 50 MG tablet, Take 50 mg by mouth., Disp: , Rfl:     No Known Allergies     reports that she has never smoked. She has never used smokeless tobacco.    Family History  "  Problem Relation Age of Onset   • Breast cancer Maternal Aunt 30   • Hypertension Mother    • Thyroid disease Mother    • Heart disease Father    • Heart attack Father    • Stroke Father    • Diabetes Father    • Neuropathy Father    • Ovarian cancer Neg Hx    • Colon cancer Neg Hx    • BRCA 1/2 Neg Hx          Objective:   Physical exam:  /74   Pulse 98   Ht 160 cm (63\")   SpO2 100%   BMI 33.23 kg/m²   CONSTITUTIONAL: No acute distress  RESPIRATORY: Normal effort. Clear to auscultation bilaterally without wheezing or rales  CARDIOVASCULAR: Regular rate and rhythm with normal S1 and S2. Without murmur, gallop or rub.  PERIPHERAL VASCULAR: Normal radial pulse. There is no lower extremity edema bilaterally.  PSYCH: Normal affect and mood    Labs:    BUN   Date Value Ref Range Status   12/20/2016 8 (L) 9 - 23 mg/dL Final     Creatinine   Date Value Ref Range Status   12/20/2016 0.60 0.60 - 1.30 mg/dL Final     Potassium   Date Value Ref Range Status   12/20/2016 4.1 3.5 - 5.5 mmol/L Final       No results found for: CHOL  Lab Results   Component Value Date    TRIG 59 04/16/2019     Lab Results   Component Value Date    HDL 50 04/16/2019     Lab Results   Component Value Date     04/16/2019     No components found for: LDLDIRECTC    Diagnostic Data:    Procedures    Tilt table test 12/2019  · Tilt table test was negative for neurocardiogenic syncope.  · Patient had postural tachycardia noted immediately upon head upright tilt that resolved upon termination of test.  · During tilt, patient with repeated episodes of what appeared to be sinus tachycardia, with abrupt drop in heart rate from 130-150, to the  range associated with a low atrial rhythm, with restoration of sinus rhythm within 2-3 beats.  · Note the tachycardic episodes were not symptomatic.    48-hour Holter 8/2019 - rare ectopy, average heart rate 96, no significant arrhythmia    Assessment and Plan:   Zaida was seen today for " orhtostatic hypotension.    Diagnoses and all orders for this visit:    Autonomic orthostatic hypotension  POTS  -Patient is still having daily symptoms  -Midrin made her stomach upset.    -Previous trial of propranolol that was discontinued due to no clear benefit.  -Fludrocortisone discontinued due to feeling unwell while taking it causing weight gain.  -Continue salt tablets and compression stockings  -Reinforced the importance of cardiac exercise.  Encourage patient to start exercising again  -Patient to see a specialist regarding her depression.  Improvement in the management of her depression may help her POTS symptoms as well.  Currently on Prozac.  -Prior referral to [facility] dysfunction clinic.  Patient to contact [facility].  -Begin trial of bisoprolol 2.5 mg p.o. daily     - Return in about 6 months (around 6/18/2021) for Next scheduled follow up.    Scribed for Matteo Quintana MD by REMIGIO Godinez   12/18/2020  11:44 EST     I, Matteo Quintana MD, personally performed the services as scribed by the above named individual. I have made any necessary edits and it is both accurate and complete.     Matteo Quintana MD, MSc, FACC, [facility]  Interventional Cardiology  [facility]

## 2020-12-18 NOTE — PROGRESS NOTES
Pt is a 30year old  Female presenting to telehealth services following 2nd referral from PCP. As per PCP referral the day prior, pt was in crisis. Pt identified that following last session pt had returned to ex briefly but he was now dating someone else, which had been difficult. Pt still working in [employer], but identified that when she was at work, she found out her Father had a diabetic ulcer on left foot, and then he had recently fallen and broken his right foot. Pt has taken off work to assist her father, noting he had a doctors appt on Monday, and then surgery on Tuesday, and she was going to try to help him as necessary. Pt identified continued stressors and events  that Rachel Miss been hard to manage.  Pt discussed continued problems with sleep, and I just cry, and my appetite has been effected, Pt discussed sleeping 2 or 3 hours nightly  [facility] discussed current medications, and pt identified being on Prozac and Trazadone, however notes when she works night shift she is unable to take Trazadone. Pt identified believing she would benefit from counseling, so she could better handle stressors and recent events. Discussed prior referral. Pt that she had attempted to request an appt 2 times with [facility]  but had never received a follow up. O:  MSE:  Appearance: Not Evaluated, as session was over phone. Attitude: \"cooperative\",\"friendly\",  Consciousness: \"alert\"  Orientation: \"oriented to person, place, time, general circumstance\"  Memory: \"recent and remote memory intact\"  Attention/Concentration: \"intact during session\"  Psychomotor Activity:\" Not Evaluated, as session was over phone. Eye Contact: Not Evaluated, as session was over phone.   Speech: \"normal rate and volume, well-articulated\",  Mood: \"anxious\",\"dysphoric  Affect: \"congruent with thought content and mood\"  Perception: \"within normal limits\",\"  Thought Content: \"within normal limits\" Thought Process: \"logical, goal-directed, coherent\"  Insight: \"good  Judgment: \"intact\"  Morbid ideation: Denies. Suicide Assessment: No Suicidal Ideation at this time. History:    Medications:   Current Outpatient Medications   Medication Sig Dispense Refill    sertraline (ZOLOFT) 50 MG tablet   5    guaiFENesin (MUCINEX) 600 MG extended release tablet Take 2 tablets by mouth 2 times daily as needed for Congestion       No current facility-administered medications for this visit. Social History:   Social History     Socioeconomic History    Marital status: Single     Spouse name: Not on file    Number of children: Not on file    Years of education: Not on file    Highest education level: Not on file   Occupational History    Not on file   Social Needs    Financial resource strain: Not on file    Food insecurity     Worry: Not on file     Inability: Not on file    Transportation needs     Medical: Not on file     Non-medical: Not on file   Tobacco Use    Smoking status: Not on file   Substance and Sexual Activity    Alcohol use: Not on file    Drug use: Not on file    Sexual activity: Not on file   Lifestyle    Physical activity     Days per week: Not on file     Minutes per session: Not on file    Stress: Not on file   Relationships    Social connections     Talks on phone: Not on file     Gets together: Not on file     Attends Restorationism service: Not on file     Active member of club or organization: Not on file     Attends meetings of clubs or organizations: Not on file     Relationship status: Not on file    Intimate partner violence     Fear of current or ex partner: Not on file     Emotionally abused: Not on file     Physically abused: Not on file     Forced sexual activity: Not on file   Other Topics Concern    Not on file   Social History Narrative    Not on file       TOBACCO:   has no history on file for tobacco.  ETOH:   has no history on file for alcohol. Family History:   No family history on file. A:  Risk Assessment, and below. RAJANI 7 SCORE 10/8/2020   RAJANI-7 Total Score 11     Interpretation of RAJANI-7 score: 5-9 = mild anxiety, 10-14 = moderate anxiety, 15+ = severe anxiety. Recommend referral to behavioral health for scores 10 or greater. PHQ Scores 10/8/2020   PHQ2 Score 4   PHQ9 Score 14     Interpretation of Total Score Depression Severity: 1-4 = Minimal depression, 5-9 = Mild depression, 10-14 = Moderate depression, 15-19 = Moderately severe depression, 20-27 = Severe depression      Diagnosis:    F32.9 Unspecified Depressive Disorder  F41.9 Unspecified Anxiety Disorder    Rule out:  F31.9 Unspecified Bipolar Disorder      Plan:  Pt interventions:  Pt provided informed consent verbally for the behavioral health program, and telehealth program during prior session, and again during this session. Discussed with patient model of service to include the limits of confidentiality (i.e. abuse reporting, suicide intervention, etc.) and short-term intervention focused approach. Pt indicated understanding. Feedback to given to PCP. Discussed perimeters of Brief Interventions with [address] [place] [address] and offered referral to outpatient care for behavioral health services. Pt expressed understanding. Provided Pt with psychoeducation, including information about Coping Skills, including some information including sensory distraction techniques, as well as anxiety and depressive coping skills handouts. Discussed options for longer-term therapy, and [facility] still being best option at this time, due to pts limited time, as well as insurance. Also identified that there was the option for Medication management if needed down the line. [address] [place] [address] gave the pt the option to pursue another path, Pt identified she would try [facility] again with new contact methods, and would reach out to [address] [place] [address] if she had difficulties, and [address] [place] [address] would assist in connecting her with other resources. Pt Behavioral Change Plan:  ? PT to continue working on work-life balance, and to work on taking care of herself. ? [facility] provided pt with contact information. Pt to contact [facility] back if she had any questions, or needed to talk before she could get an appointment. ? Safety plan identified prior, reiterated during this session. Provided with 24 hour crisis number to use if symptoms intensify. Also provided with Provided with contact information for [facility] if any questions or problems arose. ? [facility] provided [facility] contact information, as well as other forms of contact for [facility], including the chat, as well as email address, to contact to follow up with prior referral.    ? PT to contact [facility] to make appt, and to contact [facility] back if she has any questions, or if she needs other resources      Electronically signed by Anali Soria LCSW on 12/18/2020 at 4:00 PM    This note will not be viewable in [employer] for the following reason(s). This is a Psychotherapy Note.

## 2020-12-19 ENCOUNTER — IMMUNIZATION (OUTPATIENT)
Dept: VACCINE CLINIC | Facility: HOSPITAL | Age: 25
End: 2020-12-19

## 2020-12-19 PROCEDURE — 91300 HC SARSCOV02 VAC 30MCG/0.3ML IM: CPT

## 2020-12-19 PROCEDURE — 0001A: CPT

## 2020-12-22 ENCOUNTER — TELEPHONE (OUTPATIENT)
Dept: BEHAVIORAL/MENTAL HEALTH | Age: 25
End: 2020-12-22

## 2020-12-22 NOTE — TELEPHONE ENCOUNTER
12/18/2020: Pt identified that she was having difficulty finding the message box on the website, however had been able to contact the number listed on the website, and they told her that \"the reason I probably haven't heard anything back was because they have a waiting list. She put me on it. \"    12/21/2020: [facility] contacted pt back and identified that she should not be on a waiting list for 2 months. [facility] provided contact information for [address] [place] [address], noting they had telehealth services as an option, if she specified that's what she was requiring. [facility] also provided information for [facility] and Psychiatry, but explained that her insurance would cover the counseling portion, however not psychiatry. [facility] also provided information on [facility], indicating that she would have to call the office directly, to find out if they were acccepting her insurance currently. They are also offering telehealth services, [facility] provided link to website as well. Pt agreeable to reaching out to talk with Agencies to see where would be the best fit.

## 2021-01-09 ENCOUNTER — IMMUNIZATION (OUTPATIENT)
Dept: VACCINE CLINIC | Facility: HOSPITAL | Age: 26
End: 2021-01-09

## 2021-01-09 PROCEDURE — 0001A: CPT | Performed by: PHYSICIAN ASSISTANT

## 2021-01-09 PROCEDURE — 91300 HC SARSCOV02 VAC 30MCG/0.3ML IM: CPT | Performed by: PHYSICIAN ASSISTANT

## 2021-01-09 PROCEDURE — 0002A: CPT | Performed by: PHYSICIAN ASSISTANT

## 2021-01-11 ENCOUNTER — TELEPHONE (OUTPATIENT)
Dept: BEHAVIORAL/MENTAL HEALTH | Age: 26
End: 2021-01-11

## 2021-01-11 NOTE — TELEPHONE ENCOUNTER
Met for scheduled session. Pt [facility] regarding problems with TICC, identfying that she had gotten in with them, but they had cancelled on her 3 or 4 times, and rescheduled. Pt at a loss  for what to do. [facility] processed with Pt, reminded her of [facility], and identified that the prescriber was only 1-2 weeks out for scheduling new patients and they took her insurance, that that it may be an option. Pt agreeable to do this, to get the process started, and will continue to try to get into counseling. Pt will contact [facility] again for session if she needs to talk to someone prior to getting in somewhere.

## 2021-01-11 NOTE — TELEPHONE ENCOUNTER
Pt 79-25 [facility] who identified they had a cancellation the next day, and pt could come then, Pt took appt, and contacted [address] [place] [address] to let her know, and thank [address] [place] [address]  For her assistance.

## 2021-06-08 ENCOUNTER — HOSPITAL ENCOUNTER (OUTPATIENT)
Facility: HOSPITAL | Age: 26
Discharge: HOME | End: 2021-06-08
Payer: MEDICAID

## 2021-06-08 LAB
A/G RATIO: 1.6 (ref 0.8–2)
ALBUMIN SERPL-MCNC: 4.5 G/DL (ref 3.4–4.8)
ALP BLD-CCNC: 93 U/L (ref 25–100)
ALT SERPL-CCNC: 10 U/L (ref 4–36)
ANION GAP SERPL CALCULATED.3IONS-SCNC: 10 MMOL/L (ref 3–16)
AST SERPL-CCNC: 13 U/L (ref 8–33)
BASOPHILS ABSOLUTE: 0 K/UL (ref 0–0.1)
BASOPHILS RELATIVE PERCENT: 0.5 %
BILIRUB SERPL-MCNC: <0.2 MG/DL (ref 0.3–1.2)
BUN BLDV-MCNC: 14 MG/DL (ref 6–20)
CALCIUM SERPL-MCNC: 9.3 MG/DL (ref 8.5–10.5)
CHLORIDE BLD-SCNC: 101 MMOL/L (ref 98–107)
CHOLESTEROL, TOTAL: 222 MG/DL (ref 0–200)
CO2: 27 MMOL/L (ref 20–30)
CREAT SERPL-MCNC: 0.8 MG/DL (ref 0.4–1.2)
EOSINOPHILS ABSOLUTE: 0.1 K/UL (ref 0–0.4)
EOSINOPHILS RELATIVE PERCENT: 1.8 %
FOLATE: 9.34 NG/ML
GFR AFRICAN AMERICAN: >59
GFR NON-AFRICAN AMERICAN: >60
GLOBULIN: 2.8 G/DL
GLUCOSE BLD-MCNC: 94 MG/DL (ref 74–106)
HBA1C MFR BLD: 4.9 %
HCT VFR BLD CALC: 42.4 % (ref 37–47)
HDLC SERPL-MCNC: 55 MG/DL (ref 40–60)
HEMOGLOBIN: 13.6 G/DL (ref 11.5–16.5)
IMMATURE GRANULOCYTES #: 0 K/UL
IMMATURE GRANULOCYTES %: 0.5 % (ref 0–5)
LDL CHOLESTEROL CALCULATED: 139 MG/DL
LYMPHOCYTES ABSOLUTE: 2.6 K/UL (ref 1.5–4)
LYMPHOCYTES RELATIVE PERCENT: 33.9 %
MCH RBC QN AUTO: 30.3 PG (ref 27–32)
MCHC RBC AUTO-ENTMCNC: 32.1 G/DL (ref 31–35)
MCV RBC AUTO: 94.4 FL (ref 80–100)
MONOCYTES ABSOLUTE: 0.5 K/UL (ref 0.2–0.8)
MONOCYTES RELATIVE PERCENT: 6.3 %
NEUTROPHILS ABSOLUTE: 4.3 K/UL (ref 2–7.5)
NEUTROPHILS RELATIVE PERCENT: 57 %
PDW BLD-RTO: 12.8 % (ref 11–16)
PLATELET # BLD: 225 K/UL (ref 150–400)
PMV BLD AUTO: 9.6 FL (ref 6–10)
POTASSIUM SERPL-SCNC: 4.5 MMOL/L (ref 3.4–5.1)
RBC # BLD: 4.49 M/UL (ref 3.8–5.8)
SODIUM BLD-SCNC: 138 MMOL/L (ref 136–145)
TOTAL PROTEIN: 7.3 G/DL (ref 6.4–8.3)
TRIGL SERPL-MCNC: 138 MG/DL (ref 0–249)
TSH SERPL DL<=0.05 MIU/L-ACNC: 4.48 UIU/ML (ref 0.27–4.2)
VITAMIN B-12: 569 PG/ML (ref 211–911)
VLDLC SERPL CALC-MCNC: 28 MG/DL
WBC # BLD: 7.6 K/UL (ref 4–11)

## 2021-06-08 PROCEDURE — 84443 ASSAY THYROID STIM HORMONE: CPT

## 2021-06-08 PROCEDURE — 80061 LIPID PANEL: CPT

## 2021-06-08 PROCEDURE — 86480 TB TEST CELL IMMUN MEASURE: CPT

## 2021-06-08 PROCEDURE — 82746 ASSAY OF FOLIC ACID SERUM: CPT

## 2021-06-08 PROCEDURE — 80053 COMPREHEN METABOLIC PANEL: CPT

## 2021-06-08 PROCEDURE — 85025 COMPLETE CBC W/AUTO DIFF WBC: CPT

## 2021-06-08 PROCEDURE — 82607 VITAMIN B-12: CPT

## 2021-06-08 PROCEDURE — 83036 HEMOGLOBIN GLYCOSYLATED A1C: CPT

## 2021-06-11 LAB
QUANTI TB GOLD PLUS: NEGATIVE
QUANTI TB1 MINUS NIL: 0.02 IU/ML (ref 0–0.34)
QUANTI TB2 MINUS NIL: 0.04 IU/ML (ref 0–0.34)
QUANTIFERON MITOGEN: 8.67 IU/ML
QUANTIFERON NIL: 0.01 IU/ML

## 2022-03-31 ENCOUNTER — HOSPITAL ENCOUNTER (OUTPATIENT)
Facility: HOSPITAL | Age: 27
Discharge: HOME | End: 2022-03-31
Payer: MEDICAID

## 2022-03-31 LAB
A/G RATIO: 1.6 (ref 0.8–2)
ALBUMIN SERPL-MCNC: 4.2 G/DL (ref 3.4–4.8)
ALP BLD-CCNC: 126 U/L (ref 25–100)
ALT SERPL-CCNC: 11 U/L (ref 4–36)
ANION GAP SERPL CALCULATED.3IONS-SCNC: 10 MMOL/L (ref 3–16)
AST SERPL-CCNC: 12 U/L (ref 8–33)
BASOPHILS ABSOLUTE: 0 K/UL (ref 0–0.1)
BASOPHILS RELATIVE PERCENT: 0.3 %
BILIRUB SERPL-MCNC: 0.3 MG/DL (ref 0.3–1.2)
BUN BLDV-MCNC: 12 MG/DL (ref 6–20)
CALCIUM SERPL-MCNC: 9 MG/DL (ref 8.5–10.5)
CHLORIDE BLD-SCNC: 105 MMOL/L (ref 98–107)
CHOLESTEROL, TOTAL: 233 MG/DL (ref 0–200)
CO2: 25 MMOL/L (ref 20–30)
CREAT SERPL-MCNC: 0.7 MG/DL (ref 0.4–1.2)
EOSINOPHILS ABSOLUTE: 0.1 K/UL (ref 0–0.4)
EOSINOPHILS RELATIVE PERCENT: 1.1 %
FOLATE: 16.43 NG/ML
GFR AFRICAN AMERICAN: >59
GFR NON-AFRICAN AMERICAN: >60
GLOBULIN: 2.6 G/DL
GLUCOSE BLD-MCNC: 103 MG/DL (ref 74–106)
HCT VFR BLD CALC: 40.7 % (ref 37–47)
HDLC SERPL-MCNC: 45 MG/DL (ref 40–60)
HEMOGLOBIN: 13.6 G/DL (ref 11.5–16.5)
IMMATURE GRANULOCYTES #: 0 K/UL
IMMATURE GRANULOCYTES %: 0.3 % (ref 0–5)
LDL CHOLESTEROL CALCULATED: 165 MG/DL
LYMPHOCYTES ABSOLUTE: 2.1 K/UL (ref 1.5–4)
LYMPHOCYTES RELATIVE PERCENT: 31.1 %
MCH RBC QN AUTO: 29.7 PG (ref 27–32)
MCHC RBC AUTO-ENTMCNC: 33.4 G/DL (ref 31–35)
MCV RBC AUTO: 88.9 FL (ref 80–100)
MONOCYTES ABSOLUTE: 0.4 K/UL (ref 0.2–0.8)
MONOCYTES RELATIVE PERCENT: 6 %
NEUTROPHILS ABSOLUTE: 4.1 K/UL (ref 2–7.5)
NEUTROPHILS RELATIVE PERCENT: 61.2 %
PDW BLD-RTO: 12.3 % (ref 11–16)
PLATELET # BLD: 208 K/UL (ref 150–400)
PMV BLD AUTO: 9 FL (ref 6–10)
POTASSIUM SERPL-SCNC: 4.3 MMOL/L (ref 3.4–5.1)
RBC # BLD: 4.58 M/UL (ref 3.8–5.8)
SODIUM BLD-SCNC: 140 MMOL/L (ref 136–145)
T3 FREE: 3.3 PG/ML (ref 2.3–4.2)
T4 FREE: 1.14 NG/DL (ref 0.89–1.76)
TOTAL PROTEIN: 6.8 G/DL (ref 6.4–8.3)
TRIGL SERPL-MCNC: 116 MG/DL (ref 0–249)
TSH SERPL DL<=0.05 MIU/L-ACNC: 2.04 UIU/ML (ref 0.27–4.2)
VITAMIN B-12: 714 PG/ML (ref 211–911)
VITAMIN D 25-HYDROXY: 41.2 (ref 32–100)
VLDLC SERPL CALC-MCNC: 23 MG/DL
WBC # BLD: 6.6 K/UL (ref 4–11)

## 2022-03-31 PROCEDURE — 82607 VITAMIN B-12: CPT

## 2022-03-31 PROCEDURE — 80061 LIPID PANEL: CPT

## 2022-03-31 PROCEDURE — 36415 COLL VENOUS BLD VENIPUNCTURE: CPT

## 2022-03-31 PROCEDURE — 84481 FREE ASSAY (FT-3): CPT

## 2022-03-31 PROCEDURE — 84443 ASSAY THYROID STIM HORMONE: CPT

## 2022-03-31 PROCEDURE — 84439 ASSAY OF FREE THYROXINE: CPT

## 2022-03-31 PROCEDURE — 82306 VITAMIN D 25 HYDROXY: CPT

## 2022-03-31 PROCEDURE — 80053 COMPREHEN METABOLIC PANEL: CPT

## 2022-03-31 PROCEDURE — 85025 COMPLETE CBC W/AUTO DIFF WBC: CPT

## 2022-03-31 PROCEDURE — 82746 ASSAY OF FOLIC ACID SERUM: CPT

## 2022-05-13 ENCOUNTER — OFFICE VISIT (OUTPATIENT)
Dept: OTOLARYNGOLOGY | Facility: CLINIC | Age: 27
End: 2022-05-13

## 2022-05-13 VITALS
WEIGHT: 242.8 LBS | OXYGEN SATURATION: 98 % | SYSTOLIC BLOOD PRESSURE: 116 MMHG | HEIGHT: 63 IN | DIASTOLIC BLOOD PRESSURE: 74 MMHG | BODY MASS INDEX: 43.02 KG/M2 | HEART RATE: 84 BPM

## 2022-05-13 DIAGNOSIS — H68.103 OBSTRUCTION OF BOTH EUSTACHIAN TUBES: ICD-10-CM

## 2022-05-13 DIAGNOSIS — H66.014 RECURRENT ACUTE SUPPURATIVE OTITIS MEDIA OF RIGHT EAR WITH SPONTANEOUS RUPTURE OF TYMPANIC MEMBRANE: Primary | ICD-10-CM

## 2022-05-13 PROCEDURE — 99203 OFFICE O/P NEW LOW 30 MIN: CPT | Performed by: OTOLARYNGOLOGY

## 2022-05-13 PROCEDURE — 92504 EAR MICROSCOPY EXAMINATION: CPT | Performed by: OTOLARYNGOLOGY

## 2022-05-13 RX ORDER — CEFDINIR 300 MG/1
300 CAPSULE ORAL DAILY
Qty: 20 CAPSULE | Refills: 0 | Status: SHIPPED | OUTPATIENT
Start: 2022-05-13

## 2022-05-13 RX ORDER — FEXOFENADINE HCL 180 MG/1
TABLET ORAL
COMMUNITY
Start: 2022-05-04

## 2022-05-13 RX ORDER — CIPROFLOXACIN AND DEXAMETHASONE 3; 1 MG/ML; MG/ML
4 SUSPENSION/ DROPS AURICULAR (OTIC) 2 TIMES DAILY
Qty: 7.5 ML | Refills: 0 | Status: SHIPPED | OUTPATIENT
Start: 2022-05-13 | End: 2022-05-20

## 2022-05-13 RX ORDER — FLUTICASONE PROPIONATE 50 MCG
SPRAY, SUSPENSION (ML) NASAL
COMMUNITY
Start: 2022-03-08

## 2022-06-01 ENCOUNTER — OFFICE VISIT (OUTPATIENT)
Dept: OTOLARYNGOLOGY | Facility: CLINIC | Age: 27
End: 2022-06-01

## 2022-06-01 VITALS
BODY MASS INDEX: 42.88 KG/M2 | HEIGHT: 63 IN | SYSTOLIC BLOOD PRESSURE: 122 MMHG | WEIGHT: 242 LBS | DIASTOLIC BLOOD PRESSURE: 74 MMHG

## 2022-06-01 DIAGNOSIS — H74.11 ADHESIVE OTITIS MEDIA, RIGHT: ICD-10-CM

## 2022-06-01 DIAGNOSIS — H90.11 CONDUCTIVE HEARING LOSS OF RIGHT EAR, UNSPECIFIED HEARING STATUS ON CONTRALATERAL SIDE: ICD-10-CM

## 2022-06-01 DIAGNOSIS — H66.014 RECURRENT ACUTE SUPPURATIVE OTITIS MEDIA OF RIGHT EAR WITH SPONTANEOUS RUPTURE OF TYMPANIC MEMBRANE: Primary | ICD-10-CM

## 2022-06-01 DIAGNOSIS — H68.103 OBSTRUCTION OF BOTH EUSTACHIAN TUBES: ICD-10-CM

## 2022-06-01 PROCEDURE — 99213 OFFICE O/P EST LOW 20 MIN: CPT | Performed by: OTOLARYNGOLOGY

## 2022-06-01 RX ORDER — AZELASTINE 1 MG/ML
1 SPRAY, METERED NASAL 2 TIMES DAILY
Qty: 1 EACH | Refills: 10 | Status: SHIPPED | OUTPATIENT
Start: 2022-06-01

## 2022-06-01 NOTE — PROGRESS NOTES
ENT Office Consult Note     Date of Consult: 2022     Patient Name: Zaida Yates  MRN: [id number]   : 1995     Referring Provider: No ref. provider found    Care Team: Patient Care Team:  Liset Hunt APRN as PCP - General (Family Medicine)  Lester Erazo MD as Consulting Physician (Otolaryngology)     Chief Complaint:    Chief Complaint   Patient presents with   • Follow-up   • Ear Problem     Right ear pain and hearing loss       History of Present Illness: Zaida Yates is a 26 y.o. female who presents today for further evaluation of her right adhesive otitis media and hearing loss.  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  She has finished the cefdinir and Ciprodex drops..      Subjective      Review of Systems:   Review of Systems   HENT: Positive for ear pain and hearing loss. Negative for congestion, dental problem, drooling, ear discharge, facial swelling, mouth sores, nosebleeds, postnasal drip, rhinorrhea, sinus pressure, sneezing, sore throat, swollen glands, tinnitus, trouble swallowing and voice change.       I have reviewed and confirmed the accuracy of the ROS as documented by the MA/LPN/RN Lester Erazo MD     Pertinent items are noted in HPI.     Past Medical History:   Past Medical History:   Diagnosis Date   • Anxiety    • Anxiety    • Depression    • Headache    • HL (hearing loss)    • POTS (postural orthostatic tachycardia syndrome)        Past Surgical History:   Past Surgical History:   Procedure Laterality Date   • OTHER SURGICAL HISTORY      Malious bone left ear   • TONSILLECTOMY  2016       Family History:   Family History   Problem Relation Age of Onset   • Breast cancer Maternal Aunt 30   • Hypertension Mother    • Thyroid disease Mother    • Heart disease Father    • Heart attack Father    • Stroke Father    • Diabetes Father    • Neuropathy Father    • Ovarian cancer Neg Hx    • Colon cancer Neg Hx    • BRCA  "1/2 Neg Hx        Social History:   Social History     Socioeconomic History   • Marital status: Single   Tobacco Use   • Smoking status: Never Smoker   • Smokeless tobacco: Never Used   Substance and Sexual Activity   • Alcohol use: No   • Drug use: No   • Sexual activity: Never     Comment: wearing nuva ring insert       Medications:     Current Outpatient Medications:   •  fexofenadine (ALLEGRA) 180 MG tablet, , Disp: , Rfl:   •  FLUoxetine (PROzac) 20 MG capsule, Take 20 mg by mouth Daily., Disp: , Rfl:   •  fluticasone (FLONASE) 50 MCG/ACT nasal spray, , Disp: , Rfl:   •  sodium chloride 1 g tablet, Take 1 tablet by mouth 2 (Two) Times a Day With Meals., Disp: 60 tablet, Rfl: 11  •  traZODone (DESYREL) 50 MG tablet, Take 50 mg by mouth., Disp: , Rfl:   •  cefdinir (OMNICEF) 300 MG capsule, Take 1 capsule by mouth Daily. Take 2 tablets orally daily, Disp: 20 capsule, Rfl: 0    Allergies:   No Known Allergies    Objective     Physical Exam:  Vital Signs:   Vitals:    06/01/22 0805   BP: 122/74   Weight: 110 kg (242 lb)   Height: 160 cm (63\")     Body mass index is 42.87 kg/m².     General Appearance:  Alert, cooperative, in no acute distress   Head:  Normocephalic, without obvious abnormality, atraumatic   Eyes:          Conjunctivae and sclerae normal, PERRLA   Ears:   Left tympanic membrane shows tympanosclerosis but the middle ear is dry and air-filled; right ear shows cartilage graft in place posteriorly the entire tympanic membrane is quite retracted and there is some serous fluid/congestion of the right middle ear space; with some effort Terra can auto inflate the right ear using a modified Valsalva technique   Nose:  Right septal deviation with congested turbinate   Throat:  1-2+ postnasal drainage   Fiberoptic Exam:  Deferred   Neck:  No significant salivary masses or cervical adenopathy   Lungs:   Clear to auscultation,respirations regular, wilda and unlabored      Heart:  Regular rhythm and normal rate, " normal S1 and S2, no       murmur, no gallop, no rub, no click   Pulses: Pulses palpable and equal bilaterally   Skin: No bleeding, bruising or rash   Lymph nodes: No palpable adenopathy   Neurologic: Cranial nerves 2 - 12 grossly intact        Results Review:   Labs:     Imaging: No Images in the past 120 days found..      Assessment / Plan      Assessment/Plan:   Diagnoses and all orders for this visit:    1. Recurrent acute suppurative otitis media of right ear with spontaneous rupture of tympanic membrane (Primary)    2. Obstruction of both eustachian tubes    3. Conductive hearing loss of right ear, unspecified hearing status on contralateral side    4. Adhesive otitis media, right         Lilli returns for recheck of her right adhesive otitis media and likely conductive or mixed hearing loss.  She has finished the cefdinir and Ciprodex drops and the infection has cleared but she is left with moderate retraction of the right tympanic membrane and serous congestion of the right middle ear space.  She has longstanding and in fact lifelong problems with adhesive otitis in the right ear.  She has required numerous sets of ear tubes in the past and also a cartilage tympanoplasty by Dr. Cortez in 2016.  With some effort today she can auto inflate her ears.  I would certainly recommend that she auto inflate the ears 12-15 times daily to clear any negative otitic pressure up.  I would like to have her get an updated audiogram by Radha Ruiz.  She may be a candidate for hearing aids in the right ear and may also qualify through a vocational rehab as she is in nursing school.  As a last option we could consider placing a modified T-tube in the right ear although I am somewhat reluctant to do this based on the atrophic nature of her tympanic membrane and her prior history of TM perforation.  I will see her back in 3 to 4months with the updated audiogram.      Follow Up:   No follow-ups on file.    Time:     Discussed plan of care in detail with patient today. Patient verbally understands and agrees. I have spent and counseled for approximately 30 minutes face to face, with greater than 50 % of the time counseling.     Lester Erazo MD

## 2022-08-17 ENCOUNTER — HOSPITAL ENCOUNTER (OUTPATIENT)
Facility: HOSPITAL | Age: 27
Discharge: HOME | End: 2022-08-17
Payer: MEDICAID

## 2022-08-17 PROCEDURE — 86480 TB TEST CELL IMMUN MEASURE: CPT

## 2022-08-21 LAB
QUANTI TB GOLD PLUS: NEGATIVE
QUANTI TB1 MINUS NIL: 0.02 IU/ML (ref 0–0.34)
QUANTI TB2 MINUS NIL: 0 IU/ML (ref 0–0.34)
QUANTIFERON MITOGEN: >10 IU/ML
QUANTIFERON NIL: 0.02 IU/ML

## 2022-11-02 ENCOUNTER — APPOINTMENT (OUTPATIENT)
Dept: CT IMAGING | Facility: HOSPITAL | Age: 27
End: 2022-11-02

## 2022-11-02 ENCOUNTER — HOSPITAL ENCOUNTER (EMERGENCY)
Facility: HOSPITAL | Age: 27
Discharge: HOME | End: 2022-11-02
Attending: FAMILY MEDICINE | Admitting: FAMILY MEDICINE

## 2022-11-02 VITALS
SYSTOLIC BLOOD PRESSURE: 104 MMHG | HEART RATE: 97 BPM | OXYGEN SATURATION: 100 % | BODY MASS INDEX: 38.09 KG/M2 | RESPIRATION RATE: 17 BRPM | WEIGHT: 215 LBS | DIASTOLIC BLOOD PRESSURE: 51 MMHG | HEIGHT: 63 IN | TEMPERATURE: 98.1 F

## 2022-11-02 DIAGNOSIS — R11.2 NAUSEA AND VOMITING, UNSPECIFIED VOMITING TYPE: Primary | ICD-10-CM

## 2022-11-02 DIAGNOSIS — K52.9 GASTROENTERITIS: ICD-10-CM

## 2022-11-02 LAB
ALBUMIN SERPL-MCNC: 4.6 G/DL (ref 3.5–5.2)
ALBUMIN/GLOB SERPL: 1.4 G/DL
ALP SERPL-CCNC: 124 U/L (ref 39–117)
ALT SERPL W P-5'-P-CCNC: 12 U/L (ref 1–33)
ANION GAP SERPL CALCULATED.3IONS-SCNC: 12.7 MMOL/L (ref 5–15)
AST SERPL-CCNC: 13 U/L (ref 1–32)
B PARAPERT DNA SPEC QL NAA+PROBE: NOT DETECTED
B PERT DNA SPEC QL NAA+PROBE: NOT DETECTED
BACTERIA UR QL AUTO: ABNORMAL /HPF
BASOPHILS # BLD AUTO: 0.03 10*3/MM3 (ref 0–0.2)
BASOPHILS NFR BLD AUTO: 0.2 % (ref 0–1.5)
BILIRUB SERPL-MCNC: 0.5 MG/DL (ref 0–1.2)
BILIRUB UR QL STRIP: NEGATIVE
BUN SERPL-MCNC: 16 MG/DL (ref 6–20)
BUN/CREAT SERPL: 20.8 (ref 7–25)
C PNEUM DNA NPH QL NAA+NON-PROBE: NOT DETECTED
CALCIUM SPEC-SCNC: 9.9 MG/DL (ref 8.6–10.5)
CHLORIDE SERPL-SCNC: 102 MMOL/L (ref 98–107)
CLARITY UR: CLEAR
CO2 SERPL-SCNC: 23.3 MMOL/L (ref 22–29)
COLOR UR: YELLOW
CREAT SERPL-MCNC: 0.77 MG/DL (ref 0.57–1)
DEPRECATED RDW RBC AUTO: 40 FL (ref 37–54)
EGFRCR SERPLBLD CKD-EPI 2021: 107.2 ML/MIN/1.73
EOSINOPHIL # BLD AUTO: 0.02 10*3/MM3 (ref 0–0.4)
EOSINOPHIL NFR BLD AUTO: 0.1 % (ref 0.3–6.2)
ERYTHROCYTE [DISTWIDTH] IN BLOOD BY AUTOMATED COUNT: 12.5 % (ref 12.3–15.4)
FLUAV SUBTYP SPEC NAA+PROBE: NOT DETECTED
FLUBV RNA ISLT QL NAA+PROBE: NOT DETECTED
GLOBULIN UR ELPH-MCNC: 3.4 GM/DL
GLUCOSE SERPL-MCNC: 140 MG/DL (ref 65–99)
GLUCOSE UR STRIP-MCNC: NEGATIVE MG/DL
HADV DNA SPEC NAA+PROBE: NOT DETECTED
HCG SERPL QL: NEGATIVE
HCOV 229E RNA SPEC QL NAA+PROBE: NOT DETECTED
HCOV HKU1 RNA SPEC QL NAA+PROBE: NOT DETECTED
HCOV NL63 RNA SPEC QL NAA+PROBE: NOT DETECTED
HCOV OC43 RNA SPEC QL NAA+PROBE: NOT DETECTED
HCT VFR BLD AUTO: 40.4 % (ref 34–46.6)
HGB BLD-MCNC: 14 G/DL (ref 12–15.9)
HGB UR QL STRIP.AUTO: ABNORMAL
HMPV RNA NPH QL NAA+NON-PROBE: NOT DETECTED
HOLD SPECIMEN: NORMAL
HPIV1 RNA ISLT QL NAA+PROBE: NOT DETECTED
HPIV2 RNA SPEC QL NAA+PROBE: NOT DETECTED
HPIV3 RNA NPH QL NAA+PROBE: NOT DETECTED
HPIV4 P GENE NPH QL NAA+PROBE: NOT DETECTED
HYALINE CASTS UR QL AUTO: ABNORMAL /LPF
IMM GRANULOCYTES # BLD AUTO: 0.05 10*3/MM3 (ref 0–0.05)
IMM GRANULOCYTES NFR BLD AUTO: 0.4 % (ref 0–0.5)
KETONES UR QL STRIP: ABNORMAL
LEUKOCYTE ESTERASE UR QL STRIP.AUTO: ABNORMAL
LIPASE SERPL-CCNC: 7 U/L (ref 13–60)
LYMPHOCYTES # BLD AUTO: 0.75 10*3/MM3 (ref 0.7–3.1)
LYMPHOCYTES NFR BLD AUTO: 5.5 % (ref 19.6–45.3)
M PNEUMO IGG SER IA-ACNC: NOT DETECTED
MCH RBC QN AUTO: 30.3 PG (ref 26.6–33)
MCHC RBC AUTO-ENTMCNC: 34.7 G/DL (ref 31.5–35.7)
MCV RBC AUTO: 87.4 FL (ref 79–97)
MONOCYTES # BLD AUTO: 0.59 10*3/MM3 (ref 0.1–0.9)
MONOCYTES NFR BLD AUTO: 4.3 % (ref 5–12)
NEUTROPHILS NFR BLD AUTO: 12.29 10*3/MM3 (ref 1.7–7)
NEUTROPHILS NFR BLD AUTO: 89.5 % (ref 42.7–76)
NITRITE UR QL STRIP: NEGATIVE
NRBC BLD AUTO-RTO: 0 /100 WBC (ref 0–0.2)
PH UR STRIP.AUTO: >=9 [PH] (ref 5–8)
PLATELET # BLD AUTO: 258 10*3/MM3 (ref 140–450)
PMV BLD AUTO: 9 FL (ref 6–12)
POTASSIUM SERPL-SCNC: 4.3 MMOL/L (ref 3.5–5.2)
PROT SERPL-MCNC: 8 G/DL (ref 6–8.5)
PROT UR QL STRIP: ABNORMAL
RBC # BLD AUTO: 4.62 10*6/MM3 (ref 3.77–5.28)
RBC # UR STRIP: ABNORMAL /HPF
REF LAB TEST METHOD: ABNORMAL
RHINOVIRUS RNA SPEC NAA+PROBE: NOT DETECTED
RSV RNA NPH QL NAA+NON-PROBE: NOT DETECTED
SARS-COV-2 RNA NPH QL NAA+NON-PROBE: NOT DETECTED
SODIUM SERPL-SCNC: 138 MMOL/L (ref 136–145)
SP GR UR STRIP: >=1.03 (ref 1–1.03)
SQUAMOUS #/AREA URNS HPF: ABNORMAL /HPF
UROBILINOGEN UR QL STRIP: ABNORMAL
WBC # UR STRIP: ABNORMAL /HPF
WBC NRBC COR # BLD: 13.73 10*3/MM3 (ref 3.4–10.8)
WHOLE BLOOD HOLD COAG: NORMAL
WHOLE BLOOD HOLD SPECIMEN: NORMAL

## 2022-11-02 PROCEDURE — 85025 COMPLETE CBC W/AUTO DIFF WBC: CPT

## 2022-11-02 PROCEDURE — 80053 COMPREHEN METABOLIC PANEL: CPT | Performed by: FAMILY MEDICINE

## 2022-11-02 PROCEDURE — 84703 CHORIONIC GONADOTROPIN ASSAY: CPT | Performed by: FAMILY MEDICINE

## 2022-11-02 PROCEDURE — 96375 TX/PRO/DX INJ NEW DRUG ADDON: CPT

## 2022-11-02 PROCEDURE — 83690 ASSAY OF LIPASE: CPT | Performed by: FAMILY MEDICINE

## 2022-11-02 PROCEDURE — 74177 CT ABD & PELVIS W/CONTRAST: CPT

## 2022-11-02 PROCEDURE — 25010000002 PROCHLORPERAZINE 10 MG/2ML SOLUTION: Performed by: FAMILY MEDICINE

## 2022-11-02 PROCEDURE — 96374 THER/PROPH/DIAG INJ IV PUSH: CPT

## 2022-11-02 PROCEDURE — 81001 URINALYSIS AUTO W/SCOPE: CPT | Performed by: FAMILY MEDICINE

## 2022-11-02 PROCEDURE — 25010000002 IOPAMIDOL 61 % SOLUTION: Performed by: FAMILY MEDICINE

## 2022-11-02 PROCEDURE — 25010000002 ONDANSETRON PER 1 MG: Performed by: FAMILY MEDICINE

## 2022-11-02 PROCEDURE — 0202U NFCT DS 22 TRGT SARS-COV-2: CPT | Performed by: FAMILY MEDICINE

## 2022-11-02 PROCEDURE — 99284 EMERGENCY DEPT VISIT MOD MDM: CPT

## 2022-11-02 RX ORDER — ONDANSETRON 2 MG/ML
4 INJECTION INTRAMUSCULAR; INTRAVENOUS ONCE
Status: COMPLETED | OUTPATIENT
Start: 2022-11-02 | End: 2022-11-02

## 2022-11-02 RX ORDER — SODIUM CHLORIDE 0.9 % (FLUSH) 0.9 %
10 SYRINGE (ML) INJECTION AS NEEDED
Status: DISCONTINUED | OUTPATIENT
Start: 2022-11-02 | End: 2022-11-02 | Stop reason: HOSPADM

## 2022-11-02 RX ORDER — PROMETHAZINE HYDROCHLORIDE 12.5 MG/1
12.5 TABLET ORAL EVERY 6 HOURS PRN
Qty: 12 TABLET | Refills: 0 | Status: SHIPPED | OUTPATIENT
Start: 2022-11-02

## 2022-11-02 RX ORDER — PROCHLORPERAZINE EDISYLATE 5 MG/ML
10 INJECTION INTRAMUSCULAR; INTRAVENOUS ONCE
Status: COMPLETED | OUTPATIENT
Start: 2022-11-02 | End: 2022-11-02

## 2022-11-02 RX ORDER — ONDANSETRON 4 MG/1
4 TABLET, ORALLY DISINTEGRATING ORAL 4 TIMES DAILY PRN
Qty: 20 TABLET | Refills: 0 | Status: SHIPPED | OUTPATIENT
Start: 2022-11-02

## 2022-11-02 RX ADMIN — PROCHLORPERAZINE EDISYLATE 10 MG: 5 INJECTION INTRAMUSCULAR; INTRAVENOUS at 06:57

## 2022-11-02 RX ADMIN — SODIUM CHLORIDE 1000 ML: 9 INJECTION, SOLUTION INTRAVENOUS at 05:39

## 2022-11-02 RX ADMIN — IOPAMIDOL 100 ML: 612 INJECTION, SOLUTION INTRAVENOUS at 06:41

## 2022-11-02 RX ADMIN — ONDANSETRON 4 MG: 2 INJECTION INTRAMUSCULAR; INTRAVENOUS at 05:39

## 2022-11-02 NOTE — ED PROVIDER NOTES
Subjective   History of Present Illness  27-year-old female with past medical history of depression and anxiety presents to the emergency department with nausea vomiting and diarrhea.  Patient reports it started abruptly tonight around 9:00 she with nausea and vomiting patient reports she has vomited some anytime she is at the point where she can only dry heaves.  Patient reports the diarrhea started around 2:30 AM and she has had about 6 or 7 episodes.  Patient reports no other symptoms.    History provided by:  Patient  Vomiting  The primary symptoms include vomiting and diarrhea. Primary symptoms do not include fever, abdominal pain or dysuria. The illness began today. The onset was sudden.   Diarrhea  The primary symptoms include vomiting and diarrhea. Primary symptoms do not include fever, abdominal pain or dysuria.       Review of Systems   Constitutional: Negative.  Negative for fever.   HENT: Negative.    Respiratory: Negative.    Cardiovascular: Negative.  Negative for chest pain.   Gastrointestinal: Positive for diarrhea and vomiting. Negative for abdominal pain.   Endocrine: Negative.    Genitourinary: Negative.  Negative for dysuria.   Skin: Negative.    Neurological: Negative.    Psychiatric/Behavioral: Negative.    All other systems reviewed and are negative.      Past Medical History:   Diagnosis Date   • Anxiety    • Anxiety    • Depression    • Headache    • HL (hearing loss)    • POTS (postural orthostatic tachycardia syndrome)        No Known Allergies    Past Surgical History:   Procedure Laterality Date   • OTHER SURGICAL HISTORY      Malious bone left ear   • TONSILLECTOMY  12/19/2016       Family History   Problem Relation Age of Onset   • Breast cancer Maternal Aunt 30   • Hypertension Mother    • Thyroid disease Mother    • Heart disease Father    • Heart attack Father    • Stroke Father    • Diabetes Father    • Neuropathy Father    • Ovarian cancer Neg Hx    • Colon cancer Neg Hx    • BRCA  1/2 Neg Hx        Social History     Socioeconomic History   • Marital status: Single   Tobacco Use   • Smoking status: Never   • Smokeless tobacco: Never   Substance and Sexual Activity   • Alcohol use: No   • Drug use: No   • Sexual activity: Never     Comment: wearing nuva ring insert           Objective   Physical Exam  Vitals and nursing note reviewed.   Constitutional:       Appearance: Normal appearance. She is ill-appearing.   HENT:      Head: Normocephalic and atraumatic.      Right Ear: Tympanic membrane normal.      Left Ear: Tympanic membrane normal.      Nose: Nose normal.      Mouth/Throat:      Mouth: Mucous membranes are moist.   Eyes:      Extraocular Movements: Extraocular movements intact.      Pupils: Pupils are equal, round, and reactive to light.   Cardiovascular:      Rate and Rhythm: Normal rate and regular rhythm.      Pulses: Normal pulses.   Pulmonary:      Effort: Pulmonary effort is normal.   Abdominal:      General: Abdomen is flat.      Palpations: Abdomen is soft.   Musculoskeletal:         General: Normal range of motion.   Skin:     General: Skin is warm.      Capillary Refill: Capillary refill takes less than 2 seconds.   Neurological:      General: No focal deficit present.      Mental Status: She is alert and oriented to person, place, and time.   Psychiatric:         Mood and Affect: Mood normal.         Behavior: Behavior normal.         Thought Content: Thought content normal.         Judgment: Judgment normal.         Procedures           ED Course  ED Course as of 11/07/22 0744   Wed Nov 02, 2022   0715 Endorsed to dr Ratliff at shift change final disposition pending []      ED Course User Index  [] Maureen Lua DO                                           [facility]    Final diagnoses:   Nausea and vomiting, unspecified vomiting type   Gastroenteritis       ED Disposition  ED Disposition     ED Disposition   Discharge    Condition   Stable    Comment   --              Liset Hunt, APRN  [address] [facility] [address]  [phone]               Medication List      New Prescriptions    ondansetron ODT 4 MG disintegrating tablet  Commonly known as: ZOFRAN-ODT  Place 1 tablet on the tongue 4 (Four) Times a Day As Needed for Nausea.     promethazine 12.5 MG tablet  Commonly known as: PHENERGAN  Take 1 tablet by mouth Every 6 (Six) Hours As Needed for Nausea or Vomiting.           Where to Get Your Medications      These medications were sent to [facility] - [place], [place] - [address] [facility] - [phone]  - [phone]   [address] [facility] [address]    Phone: [phone]   · ondansetron ODT 4 MG disintegrating tablet  · promethazine 12.5 MG tablet          Maureen uLa DO  11/07/22 0740

## 2023-12-12 ENCOUNTER — TRANSCRIBE ORDERS (OUTPATIENT)
Dept: LAB | Facility: HOSPITAL | Age: 28
End: 2023-12-12
Payer: MEDICAID

## 2023-12-12 ENCOUNTER — LAB (OUTPATIENT)
Dept: LAB | Facility: HOSPITAL | Age: 28
End: 2023-12-12
Payer: MEDICAID

## 2023-12-12 DIAGNOSIS — Z01.419 ROUTINE GYNECOLOGICAL EXAMINATION: ICD-10-CM

## 2023-12-12 DIAGNOSIS — Z01.419 ROUTINE GYNECOLOGICAL EXAMINATION: Primary | ICD-10-CM

## 2023-12-12 LAB
25(OH)D3 SERPL-MCNC: 34.2 NG/ML (ref 30–100)
ALBUMIN SERPL-MCNC: 4.5 G/DL (ref 3.5–5.2)
ALBUMIN/GLOB SERPL: 1.5 G/DL
ALP SERPL-CCNC: 91 U/L (ref 39–117)
ALT SERPL W P-5'-P-CCNC: 16 U/L (ref 1–33)
ANION GAP SERPL CALCULATED.3IONS-SCNC: 12.6 MMOL/L (ref 5–15)
AST SERPL-CCNC: 20 U/L (ref 1–32)
BILIRUB SERPL-MCNC: 0.2 MG/DL (ref 0–1.2)
BUN SERPL-MCNC: 13 MG/DL (ref 6–20)
BUN/CREAT SERPL: 12.6 (ref 7–25)
CALCIUM SPEC-SCNC: 9.8 MG/DL (ref 8.6–10.5)
CHLORIDE SERPL-SCNC: 102 MMOL/L (ref 98–107)
CHOLEST SERPL-MCNC: 226 MG/DL (ref 0–200)
CO2 SERPL-SCNC: 23.4 MMOL/L (ref 22–29)
CREAT SERPL-MCNC: 1.03 MG/DL (ref 0.57–1)
DEPRECATED RDW RBC AUTO: 39.5 FL (ref 37–54)
EGFRCR SERPLBLD CKD-EPI 2021: 75.2 ML/MIN/1.73
ERYTHROCYTE [DISTWIDTH] IN BLOOD BY AUTOMATED COUNT: 12.1 % (ref 12.3–15.4)
GLOBULIN UR ELPH-MCNC: 3 GM/DL
GLUCOSE SERPL-MCNC: 79 MG/DL (ref 65–99)
HBA1C MFR BLD: 5 % (ref 4.8–5.6)
HCT VFR BLD AUTO: 43.9 % (ref 34–46.6)
HDLC SERPL QL: 4.19
HDLC SERPL-MCNC: 54 MG/DL (ref 40–60)
HGB BLD-MCNC: 14.9 G/DL (ref 12–15.9)
LDLC SERPL CALC-MCNC: 145 MG/DL (ref 0–100)
MCH RBC QN AUTO: 30.7 PG (ref 26.6–33)
MCHC RBC AUTO-ENTMCNC: 33.9 G/DL (ref 31.5–35.7)
MCV RBC AUTO: 90.5 FL (ref 79–97)
PLATELET # BLD AUTO: 279 10*3/MM3 (ref 140–450)
PMV BLD AUTO: 9.6 FL (ref 6–12)
POTASSIUM SERPL-SCNC: 3.8 MMOL/L (ref 3.5–5.2)
PROT SERPL-MCNC: 7.5 G/DL (ref 6–8.5)
RBC # BLD AUTO: 4.85 10*6/MM3 (ref 3.77–5.28)
SODIUM SERPL-SCNC: 138 MMOL/L (ref 136–145)
TRIGL SERPL-MCNC: 151 MG/DL (ref 0–150)
VIT B12 BLD-MCNC: 427 PG/ML (ref 211–946)
VLDLC SERPL-MCNC: 27 MG/DL (ref 5–40)
WBC NRBC COR # BLD AUTO: 8.48 10*3/MM3 (ref 3.4–10.8)

## 2023-12-12 PROCEDURE — 85027 COMPLETE CBC AUTOMATED: CPT

## 2023-12-12 PROCEDURE — 83036 HEMOGLOBIN GLYCOSYLATED A1C: CPT

## 2023-12-12 PROCEDURE — 82306 VITAMIN D 25 HYDROXY: CPT

## 2023-12-12 PROCEDURE — 82607 VITAMIN B-12: CPT

## 2023-12-12 PROCEDURE — 80061 LIPID PANEL: CPT

## 2023-12-12 PROCEDURE — 80053 COMPREHEN METABOLIC PANEL: CPT

## 2023-12-12 PROCEDURE — 36415 COLL VENOUS BLD VENIPUNCTURE: CPT

## 2024-06-19 ENCOUNTER — OFFICE VISIT (OUTPATIENT)
Dept: GASTROENTEROLOGY | Facility: CLINIC | Age: 29
End: 2024-06-19
Payer: COMMERCIAL

## 2024-06-19 VITALS
SYSTOLIC BLOOD PRESSURE: 110 MMHG | HEIGHT: 63 IN | DIASTOLIC BLOOD PRESSURE: 60 MMHG | BODY MASS INDEX: 29.23 KG/M2 | HEART RATE: 90 BPM | WEIGHT: 165 LBS | OXYGEN SATURATION: 98 %

## 2024-06-19 DIAGNOSIS — R14.0 BLOATING: ICD-10-CM

## 2024-06-19 DIAGNOSIS — R11.2 NAUSEA AND VOMITING, UNSPECIFIED VOMITING TYPE: ICD-10-CM

## 2024-06-19 DIAGNOSIS — R10.13 EPIGASTRIC PAIN: ICD-10-CM

## 2024-06-19 DIAGNOSIS — K59.00 CONSTIPATION, UNSPECIFIED CONSTIPATION TYPE: Primary | ICD-10-CM

## 2024-06-19 PROCEDURE — 99204 OFFICE O/P NEW MOD 45 MIN: CPT | Performed by: NURSE PRACTITIONER

## 2024-06-19 RX ORDER — LEVONORGESTREL 19.5 MG/1
INTRAUTERINE DEVICE INTRAUTERINE
COMMUNITY

## 2024-06-19 RX ORDER — METFORMIN HYDROCHLORIDE 750 MG/1
750 TABLET, EXTENDED RELEASE ORAL
COMMUNITY
Start: 2023-12-12

## 2024-06-19 RX ORDER — POLYETHYLENE GLYCOL 3350 17 G/17G
17 POWDER, FOR SOLUTION ORAL DAILY
Qty: 510 G | Refills: 11 | Status: SHIPPED | OUTPATIENT
Start: 2024-06-19

## 2024-06-19 RX ORDER — BUSPIRONE HYDROCHLORIDE 10 MG/1
10 TABLET ORAL
COMMUNITY
Start: 2024-06-13

## 2024-06-19 RX ORDER — ESOMEPRAZOLE MAGNESIUM 40 MG/1
CAPSULE, DELAYED RELEASE ORAL
COMMUNITY
Start: 2024-05-21

## 2024-06-19 NOTE — PROGRESS NOTES
GASTROENTEROLOGY OFFICE NOTE    Zaida Yates  [id number]  1995    CARE TEAM  Patient Care Team:  Liset Hunt APRN as PCP - General (Family Medicine)  Lester Erazo MD as Consulting Physician (Otolaryngology)  Elba Pascal APRN as Nurse Practitioner (Gastroenterology)    Referring Provider: Liset Hunt APRN    Chief Complaint   Patient presents with    Abdominal Pain     New patient     Heartburn        HISTORY OF PRESENT ILLNESS:   Zaida Yates is a 28 y.o. female who presents to the clinic today as a referral from Liset FLOOD for evaluation regarding nausea, abdominal pain, reflux.  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    She reports epigastric abdominal pain, nausea with vomiting, diarrhea and constipation.  She reports experiencing GI symptoms her whole life but symptoms seem worse over the past year.      She denies having a bowel movement for the past 7 days.  She took 1 dose of MiraLAX 2 days ago without experiencing a bowel movement.  She reports intermittent diarrhea that usually occurs when working night shift.  She reported occasional bright red blood with bowel movement.    She reports abdominal bloating and gas when she is not having bowel movements that seem better when she is having bowel movement.    She has recently tried to increase intake of fiber, eat prunes without change in bowel habits.    Epigastric pain seems worse after eating.  She started esomeprazole approximately 1 month ago without change in symptoms.    She took 1 dose of Wegovy approximately 8 months ago which aggravated symptoms.    She has been taking metformin for approximately 8 months and does not feel as though metformin is affecting GI symptoms.    She takes ibuprofen as needed for menstrual cramps.    Current use of Zofran and prior use of promethazine helpful for upset stomach and vomiting.    She has not had prior  EGD nor colonoscopy.    Past Medical History:   Diagnosis Date    Anxiety     Anxiety     Depression     Headache     HL (hearing loss)     POTS (postural orthostatic tachycardia syndrome)         Past Surgical History:   Procedure Laterality Date    OTHER SURGICAL HISTORY      Malious bone left ear    TONSILLECTOMY  12/19/2016        Current Outpatient Medications on File Prior to Visit   Medication Sig    azelastine (ASTELIN) 0.1 % nasal spray 1 spray into the nostril(s) as directed by provider 2 (Two) Times a Day. Use in each nostril as directed    busPIRone (BUSPAR) 10 MG tablet Take 1 tablet by mouth.    esomeprazole (nexIUM) 40 MG capsule     fexofenadine (ALLEGRA) 180 MG tablet     FLUoxetine (PROzac) 20 MG capsule Take 1 capsule by mouth Daily.    fluticasone (FLONASE) 50 MCG/ACT nasal spray     levonorgestrel (Kyleena) 19.5 MG intrauterine device IUD Kyleena    metFORMIN ER (GLUCOPHAGE-XR) 750 MG 24 hr tablet Take 1 tablet by mouth Daily With Breakfast.    ondansetron ODT (ZOFRAN-ODT) 4 MG disintegrating tablet Place 1 tablet on the tongue 4 (Four) Times a Day As Needed for Nausea.    sodium chloride 1 g tablet Take 1 tablet by mouth 2 (Two) Times a Day With Meals.    promethazine (PHENERGAN) 12.5 MG tablet Take 1 tablet by mouth Every 6 (Six) Hours As Needed for Nausea or Vomiting.    [DISCONTINUED] cefdinir (OMNICEF) 300 MG capsule Take 1 capsule by mouth Daily. Take 2 tablets orally daily (Patient not taking: Reported on 6/19/2024)    [DISCONTINUED] Semaglutide-Weight Management (Wegovy) 0.25 MG/0.5ML solution auto-injector Inject 0.25 mg under the skin into the appropriate area as directed 1 (One) Time Per Week. (Patient not taking: Reported on 6/19/2024)    [DISCONTINUED] traZODone (DESYREL) 50 MG tablet Take 50 mg by mouth. (Patient not taking: Reported on 6/19/2024)     No current facility-administered medications on file prior to visit.       No Known Allergies    Family History   Problem Relation  "Age of Onset    Hypertension Mother     Thyroid disease Mother     Heart disease Father     Heart attack Father     Stroke Father     Diabetes Father     Neuropathy Father     Breast cancer Maternal Aunt 30    Ovarian cancer Neg Hx     Colon cancer Neg Hx     BRCA 1/2 Neg Hx     Esophageal cancer Neg Hx        Social History     Socioeconomic History    Marital status: Single   Tobacco Use    Smoking status: Never    Smokeless tobacco: Never   Vaping Use    Vaping status: Never Used   Substance and Sexual Activity    Alcohol use: No    Drug use: No    Sexual activity: Never     Comment: wearing nuva ring insert       PHYSICAL EXAM   /60 (BP Location: Left arm, Patient Position: Sitting, Cuff Size: Adult)   Pulse 90   Ht 160 cm (63\")   Wt 74.8 kg (165 lb)   SpO2 98%   BMI 29.23 kg/m²   Physical Exam  Constitutional:       General: She is not in acute distress.     Appearance: She is not toxic-appearing.   HENT:      Head: Normocephalic and atraumatic. No contusion.      Right Ear: External ear normal.      Left Ear: External ear normal.   Eyes:      General: Lids are normal. No scleral icterus.        Right eye: No discharge.         Left eye: No discharge.      Extraocular Movements: Extraocular movements intact.   Neck:      Trachea: Trachea normal.      Comments: No visible mass  No visible adenopathy  Cardiovascular:      Rate and Rhythm: Normal rate.   Pulmonary:      Effort: No respiratory distress.      Comments: Symmetrical expansion    Abdominal:      Palpations: Abdomen is soft. There is no mass.      Tenderness: There is no abdominal tenderness.   Musculoskeletal:      Comments: Symmetrical movement of upper extremities  Symmetrical movement of lower extremities  No visible deformities   Skin:     General: Skin is warm and dry.      Coloration: Skin is not jaundiced.   Neurological:      General: No focal deficit present.      Mental Status: She is alert.   Psychiatric:         Mood and Affect: " Mood normal.         Behavior: Behavior normal.         Thought Content: Thought content normal.     Results Review:  Ultras[facility] gallbladder 1/21/2019 at [facility] due to abdominal, epigastric pain, right upper quadrant pain and nausea was without abnormality.  CMP          12/12/2023    10:43   CMP   Glucose 79    BUN 13    Creatinine 1.03    EGFR 75.2    Sodium 138    Potassium 3.8    Chloride 102    Calcium 9.8    Total Protein 7.5    Albumin 4.5    Globulin 3.0    Total Bilirubin 0.2    Alkaline Phosphatase 91    AST (SGOT) 20    ALT (SGPT) 16    Albumin/Globulin Ratio 1.5    BUN/Creatinine Ratio 12.6    Anion Gap 12.6      CBC          12/12/2023    10:43   CBC   WBC 8.48    RBC 4.85    Hemoglobin 14.9    Hematocrit 43.9    MCV 90.5    MCH 30.7    MCHC 33.9    RDW 12.1    Platelets 279      Folate   Date Value Ref Range Status   03/31/2022 16.43 >5.4 ng/mL Final     Vitamin B-12   Date Value Ref Range Status   12/12/2023 427 211 - 946 pg/mL Final    ASSESSMENT / PLAN  1. Constipation, unspecified constipation type  2. Bloating    -I am concerned that skipping 7 or more days without a bowel movement is contributing to symptoms including increased gas, bloating, abdominal pain, diarrhea, nausea with vomiting.  -Start MiraLAX daily, around the time she would typically start working night shift.  If she is unable to tolerate taking MiraLAX at beginning of night shift, consider taking just prior to bed or another time of day that seems helpful for bowel habits without interfering with work or other personal activities  -If bright red blood with bowel movement does not improve with use of MiraLAX or more frequent bowel movements consider colonoscopy for additional evaluation.  - polyethylene glycol (MiraLax) 17 GM/SCOOP powder; Take 17 g by mouth Daily. Mix in 8 oz of any liquid once daily  Dispense: 510 g; Refill: 11  3. Nausea and vomiting, unspecified vomiting type  4. Epigastric pain  -Plan for EGD  for additional evaluation  -We discussed possible differential of gastroparesis with printed information regarding lifestyle modification for gastroparesis provided in the office  -Possible differential of reflux for which I recommend she continue esomeprazole daily, 30 minutes prior to a meal and work on lifestyle modifications for gastroparesis  -Consider repeat ultrasound gallbladder to evaluate for gallstones or sludge which may be contributing to symptoms  -Avoiding greasy, fried, fatty foods which should be on lifestyle modifications for gastroparesis could be helpful    I mentioned concern that working night shift could be contributing to symptoms  Return for Follow-up after EGD.    Elba Pascal, APRN  06/19/2024

## 2024-09-11 ENCOUNTER — TELEPHONE (OUTPATIENT)
Dept: GASTROENTEROLOGY | Facility: CLINIC | Age: 29
End: 2024-09-11
Payer: COMMERCIAL

## 2025-03-26 ENCOUNTER — HOSPITAL ENCOUNTER (OUTPATIENT)
Facility: HOSPITAL | Age: 30
Discharge: HOME | End: 2025-03-26
Payer: COMMERCIAL

## 2025-03-26 LAB
25(OH)D3 SERPL-MCNC: 38.5 NG/ML (ref 32–100)
ALBUMIN SERPL-MCNC: 4.5 G/DL (ref 3.4–4.8)
ALBUMIN/GLOB SERPL: 1.5 {RATIO} (ref 0.8–2)
ALP SERPL-CCNC: 63 U/L (ref 25–100)
ALT SERPL-CCNC: 13 U/L (ref 4–36)
ANION GAP SERPL CALCULATED.3IONS-SCNC: 13 MMOL/L (ref 3–16)
AST SERPL-CCNC: 15 U/L (ref 8–33)
BASOPHILS # BLD: 0 K/UL (ref 0–0.1)
BASOPHILS NFR BLD: 0.4 %
BILIRUB SERPL-MCNC: 0.3 MG/DL (ref 0.3–1.2)
BUN SERPL-MCNC: 12 MG/DL (ref 6–20)
CALCIUM SERPL-MCNC: 10.2 MG/DL (ref 8.3–10.6)
CHLORIDE SERPL-SCNC: 105 MMOL/L (ref 98–107)
CHOLEST SERPL-MCNC: 197 MG/DL (ref 0–200)
CO2 SERPL-SCNC: 23 MMOL/L (ref 20–30)
CREAT SERPL-MCNC: 0.7 MG/DL (ref 0.4–1.2)
EOSINOPHIL # BLD: 0.1 K/UL (ref 0–0.4)
EOSINOPHIL NFR BLD: 1 %
ERYTHROCYTE [DISTWIDTH] IN BLOOD BY AUTOMATED COUNT: 12.3 % (ref 11–16)
FOLATE SERPL-MCNC: 14.7 NG/ML
GFR SERPLBLD CREATININE-BSD FMLA CKD-EPI: >90 ML/MIN/{1.73_M2}
GLOBULIN SER CALC-MCNC: 3 G/DL
GLUCOSE SERPL-MCNC: 77 MG/DL (ref 74–106)
HBA1C MFR BLD: 5 %
HCT VFR BLD AUTO: 45.9 % (ref 37–47)
HDLC SERPL-MCNC: 54 MG/DL (ref 40–60)
HGB BLD-MCNC: 14.7 G/DL (ref 11.5–16.5)
IMM GRANULOCYTES # BLD: 0 K/UL
IMM GRANULOCYTES NFR BLD: 0.1 % (ref 0–5)
LDLC SERPL CALC-MCNC: 131 MG/DL
LYMPHOCYTES # BLD: 2.4 K/UL (ref 1.5–4)
LYMPHOCYTES NFR BLD: 34.4 %
MAGNESIUM SERPL-MCNC: 1.9 MG/DL (ref 1.7–2.4)
MCH RBC QN AUTO: 30.2 PG (ref 27–32)
MCHC RBC AUTO-ENTMCNC: 32 G/DL (ref 31–35)
MCV RBC AUTO: 94.4 FL (ref 80–100)
MONOCYTES # BLD: 0.4 K/UL (ref 0.2–0.8)
MONOCYTES NFR BLD: 6 %
NEUTROPHILS # BLD: 4.1 K/UL (ref 2–7.5)
NEUTS SEG NFR BLD: 58.1 %
PLATELET # BLD AUTO: 233 K/UL (ref 150–400)
PMV BLD AUTO: 9.6 FL (ref 6–10)
POTASSIUM SERPL-SCNC: 4 MMOL/L (ref 3.4–5.1)
PROT SERPL-MCNC: 7.5 G/DL (ref 6.4–8.3)
RBC # BLD AUTO: 4.86 M/UL (ref 3.8–5.8)
SODIUM SERPL-SCNC: 141 MMOL/L (ref 136–145)
TRIGL SERPL-MCNC: 59 MG/DL (ref 0–249)
TSH SERPL DL<=0.005 MIU/L-ACNC: 0.98 UIU/ML (ref 0.27–4.2)
VIT B12 SERPL-MCNC: 449 PG/ML (ref 211–911)
VLDLC SERPL CALC-MCNC: 12 MG/DL
WBC # BLD AUTO: 7 K/UL (ref 4–11)

## 2025-03-26 PROCEDURE — 85025 COMPLETE CBC W/AUTO DIFF WBC: CPT

## 2025-03-26 PROCEDURE — 82746 ASSAY OF FOLIC ACID SERUM: CPT

## 2025-03-26 PROCEDURE — 82671 ASSAY OF ESTROGENS: CPT

## 2025-03-26 PROCEDURE — 84443 ASSAY THYROID STIM HORMONE: CPT

## 2025-03-26 PROCEDURE — 83002 ASSAY OF GONADOTROPIN (LH): CPT

## 2025-03-26 PROCEDURE — 82306 VITAMIN D 25 HYDROXY: CPT

## 2025-03-26 PROCEDURE — 83001 ASSAY OF GONADOTROPIN (FSH): CPT

## 2025-03-26 PROCEDURE — 80061 LIPID PANEL: CPT

## 2025-03-26 PROCEDURE — 84403 ASSAY OF TOTAL TESTOSTERONE: CPT

## 2025-03-26 PROCEDURE — 80053 COMPREHEN METABOLIC PANEL: CPT

## 2025-03-26 PROCEDURE — 83036 HEMOGLOBIN GLYCOSYLATED A1C: CPT

## 2025-03-26 PROCEDURE — 83735 ASSAY OF MAGNESIUM: CPT

## 2025-03-26 PROCEDURE — 36415 COLL VENOUS BLD VENIPUNCTURE: CPT

## 2025-03-26 PROCEDURE — 82607 VITAMIN B-12: CPT

## 2025-03-27 LAB
FSH SERPL-ACNC: 6.3 MIU/ML
LH SERPL-ACNC: 9.5 MIU/ML

## 2025-03-28 LAB — TESTOST SERPL-MCNC: 51 NG/DL (ref 8–48)

## 2025-03-31 LAB
ESTRADIOL SERPL HS-MCNC: 95.6 PG/ML
ESTROGEN SERPL CALC-MCNC: 142.3 PG/ML
ESTRONE SERPL-MCNC: 46.7 PG/ML